# Patient Record
Sex: FEMALE | URBAN - METROPOLITAN AREA
[De-identification: names, ages, dates, MRNs, and addresses within clinical notes are randomized per-mention and may not be internally consistent; named-entity substitution may affect disease eponyms.]

---

## 2017-08-01 PROBLEM — Z00.00 ENCOUNTER FOR PREVENTIVE HEALTH EXAMINATION: Status: ACTIVE | Noted: 2017-08-01

## 2018-03-06 ENCOUNTER — INPATIENT (INPATIENT)
Facility: HOSPITAL | Age: 73
LOS: 1 days | Discharge: HOME | DRG: 689 | End: 2018-03-08
Attending: INTERNAL MEDICINE | Admitting: INTERNAL MEDICINE
Payer: MEDICARE

## 2018-03-06 VITALS
WEIGHT: 199.96 LBS | TEMPERATURE: 99 F | HEART RATE: 62 BPM | OXYGEN SATURATION: 97 % | SYSTOLIC BLOOD PRESSURE: 154 MMHG | DIASTOLIC BLOOD PRESSURE: 74 MMHG | RESPIRATION RATE: 18 BRPM

## 2018-03-06 DIAGNOSIS — Z29.9 ENCOUNTER FOR PROPHYLACTIC MEASURES, UNSPECIFIED: ICD-10-CM

## 2018-03-06 DIAGNOSIS — N18.9 CHRONIC KIDNEY DISEASE, UNSPECIFIED: ICD-10-CM

## 2018-03-06 DIAGNOSIS — G35 MULTIPLE SCLEROSIS: ICD-10-CM

## 2018-03-06 DIAGNOSIS — G92 TOXIC ENCEPHALOPATHY: ICD-10-CM

## 2018-03-06 DIAGNOSIS — N39.0 URINARY TRACT INFECTION, SITE NOT SPECIFIED: ICD-10-CM

## 2018-03-06 DIAGNOSIS — E78.5 HYPERLIPIDEMIA, UNSPECIFIED: ICD-10-CM

## 2018-03-06 DIAGNOSIS — I10 ESSENTIAL (PRIMARY) HYPERTENSION: ICD-10-CM

## 2018-03-06 DIAGNOSIS — R63.8 OTHER SYMPTOMS AND SIGNS CONCERNING FOOD AND FLUID INTAKE: ICD-10-CM

## 2018-03-06 LAB
ALBUMIN SERPL ELPH-MCNC: 3.6 G/DL — SIGNIFICANT CHANGE UP (ref 3.3–5)
ALP SERPL-CCNC: 88 U/L — SIGNIFICANT CHANGE UP (ref 40–120)
ALT FLD-CCNC: 16 U/L — SIGNIFICANT CHANGE UP (ref 10–45)
ANION GAP SERPL CALC-SCNC: 11 MMOL/L — SIGNIFICANT CHANGE UP (ref 5–17)
APPEARANCE UR: CLEAR — SIGNIFICANT CHANGE UP
AST SERPL-CCNC: 21 U/L — SIGNIFICANT CHANGE UP (ref 10–40)
BACTERIA # UR AUTO: PRESENT /HPF
BASOPHILS NFR BLD AUTO: 0.4 % — SIGNIFICANT CHANGE UP (ref 0–2)
BILIRUB SERPL-MCNC: 0.2 MG/DL — SIGNIFICANT CHANGE UP (ref 0.2–1.2)
BILIRUB UR-MCNC: NEGATIVE — SIGNIFICANT CHANGE UP
BUN SERPL-MCNC: 20 MG/DL — SIGNIFICANT CHANGE UP (ref 7–23)
CALCIUM SERPL-MCNC: 9.7 MG/DL — SIGNIFICANT CHANGE UP (ref 8.4–10.5)
CHLORIDE SERPL-SCNC: 105 MMOL/L — SIGNIFICANT CHANGE UP (ref 96–108)
CO2 SERPL-SCNC: 24 MMOL/L — SIGNIFICANT CHANGE UP (ref 22–31)
COLOR SPEC: YELLOW — SIGNIFICANT CHANGE UP
CREAT SERPL-MCNC: 1.41 MG/DL — HIGH (ref 0.5–1.3)
DIFF PNL FLD: NEGATIVE — SIGNIFICANT CHANGE UP
EOSINOPHIL NFR BLD AUTO: 3.1 % — SIGNIFICANT CHANGE UP (ref 0–6)
EPI CELLS # UR: (no result) /HPF (ref 0–5)
GLUCOSE SERPL-MCNC: 103 MG/DL — HIGH (ref 70–99)
GLUCOSE UR QL: NEGATIVE — SIGNIFICANT CHANGE UP
HCT VFR BLD CALC: 34.9 % — SIGNIFICANT CHANGE UP (ref 34.5–45)
HGB BLD-MCNC: 11.3 G/DL — LOW (ref 11.5–15.5)
KETONES UR-MCNC: NEGATIVE — SIGNIFICANT CHANGE UP
LEUKOCYTE ESTERASE UR-ACNC: NEGATIVE — SIGNIFICANT CHANGE UP
LYMPHOCYTES # BLD AUTO: 22.5 % — SIGNIFICANT CHANGE UP (ref 13–44)
MCHC RBC-ENTMCNC: 31.7 PG — SIGNIFICANT CHANGE UP (ref 27–34)
MCHC RBC-ENTMCNC: 32.4 G/DL — SIGNIFICANT CHANGE UP (ref 32–36)
MCV RBC AUTO: 97.8 FL — SIGNIFICANT CHANGE UP (ref 80–100)
MONOCYTES NFR BLD AUTO: 11.5 % — SIGNIFICANT CHANGE UP (ref 2–14)
NEUTROPHILS NFR BLD AUTO: 62.5 % — SIGNIFICANT CHANGE UP (ref 43–77)
NITRITE UR-MCNC: NEGATIVE — SIGNIFICANT CHANGE UP
PH UR: 5.5 — SIGNIFICANT CHANGE UP (ref 5–8)
PLATELET # BLD AUTO: 352 K/UL — SIGNIFICANT CHANGE UP (ref 150–400)
POTASSIUM SERPL-MCNC: 4.2 MMOL/L — SIGNIFICANT CHANGE UP (ref 3.5–5.3)
POTASSIUM SERPL-SCNC: 4.2 MMOL/L — SIGNIFICANT CHANGE UP (ref 3.5–5.3)
PROT SERPL-MCNC: 7.2 G/DL — SIGNIFICANT CHANGE UP (ref 6–8.3)
PROT UR-MCNC: (no result) MG/DL
RBC # BLD: 3.57 M/UL — LOW (ref 3.8–5.2)
RBC # FLD: 14.8 % — SIGNIFICANT CHANGE UP (ref 10.3–16.9)
RBC CASTS # UR COMP ASSIST: < 5 /HPF — SIGNIFICANT CHANGE UP
SODIUM SERPL-SCNC: 140 MMOL/L — SIGNIFICANT CHANGE UP (ref 135–145)
SP GR SPEC: 1.02 — SIGNIFICANT CHANGE UP (ref 1–1.03)
UROBILINOGEN FLD QL: 0.2 E.U./DL — SIGNIFICANT CHANGE UP
WBC # BLD: 7.6 K/UL — SIGNIFICANT CHANGE UP (ref 3.8–10.5)
WBC # FLD AUTO: 7.6 K/UL — SIGNIFICANT CHANGE UP (ref 3.8–10.5)
WBC UR QL: < 5 /HPF — SIGNIFICANT CHANGE UP

## 2018-03-06 PROCEDURE — 99285 EMERGENCY DEPT VISIT HI MDM: CPT

## 2018-03-06 PROCEDURE — 99223 1ST HOSP IP/OBS HIGH 75: CPT | Mod: GC

## 2018-03-06 RX ORDER — ATORVASTATIN CALCIUM 80 MG/1
20 TABLET, FILM COATED ORAL AT BEDTIME
Qty: 0 | Refills: 0 | Status: DISCONTINUED | OUTPATIENT
Start: 2018-03-06 | End: 2018-03-08

## 2018-03-06 RX ORDER — LOSARTAN POTASSIUM 100 MG/1
100 TABLET, FILM COATED ORAL AT BEDTIME
Qty: 0 | Refills: 0 | Status: DISCONTINUED | OUTPATIENT
Start: 2018-03-06 | End: 2018-03-07

## 2018-03-06 RX ORDER — HEPARIN SODIUM 5000 [USP'U]/ML
5000 INJECTION INTRAVENOUS; SUBCUTANEOUS EVERY 8 HOURS
Qty: 0 | Refills: 0 | Status: DISCONTINUED | OUTPATIENT
Start: 2018-03-06 | End: 2018-03-08

## 2018-03-06 RX ORDER — CEFEPIME 1 G/1
1000 INJECTION, POWDER, FOR SOLUTION INTRAMUSCULAR; INTRAVENOUS ONCE
Qty: 0 | Refills: 0 | Status: COMPLETED | OUTPATIENT
Start: 2018-03-07 | End: 2018-03-07

## 2018-03-06 RX ORDER — TAMSULOSIN HYDROCHLORIDE 0.4 MG/1
0.4 CAPSULE ORAL
Qty: 0 | Refills: 0 | Status: DISCONTINUED | OUTPATIENT
Start: 2018-03-06 | End: 2018-03-08

## 2018-03-06 RX ORDER — ISOSORBIDE MONONITRATE 60 MG/1
120 TABLET, EXTENDED RELEASE ORAL AT BEDTIME
Qty: 0 | Refills: 0 | Status: DISCONTINUED | OUTPATIENT
Start: 2018-03-06 | End: 2018-03-08

## 2018-03-06 RX ORDER — CEFEPIME 1 G/1
1000 INJECTION, POWDER, FOR SOLUTION INTRAMUSCULAR; INTRAVENOUS ONCE
Qty: 0 | Refills: 0 | Status: COMPLETED | OUTPATIENT
Start: 2018-03-06 | End: 2018-03-06

## 2018-03-06 RX ORDER — LEVOTHYROXINE SODIUM 125 MCG
175 TABLET ORAL DAILY
Qty: 0 | Refills: 0 | Status: DISCONTINUED | OUTPATIENT
Start: 2018-03-06 | End: 2018-03-08

## 2018-03-06 RX ORDER — ASCORBIC ACID 60 MG
250 TABLET,CHEWABLE ORAL DAILY
Qty: 0 | Refills: 0 | Status: DISCONTINUED | OUTPATIENT
Start: 2018-03-06 | End: 2018-03-08

## 2018-03-06 RX ORDER — MEMANTINE HYDROCHLORIDE 10 MG/1
10 TABLET ORAL DAILY
Qty: 0 | Refills: 0 | Status: DISCONTINUED | OUTPATIENT
Start: 2018-03-06 | End: 2018-03-08

## 2018-03-06 RX ORDER — ACETAMINOPHEN 500 MG
1000 TABLET ORAL EVERY 12 HOURS
Qty: 0 | Refills: 0 | Status: DISCONTINUED | OUTPATIENT
Start: 2018-03-06 | End: 2018-03-08

## 2018-03-06 RX ORDER — ASPIRIN/CALCIUM CARB/MAGNESIUM 324 MG
81 TABLET ORAL DAILY
Qty: 0 | Refills: 0 | Status: DISCONTINUED | OUTPATIENT
Start: 2018-03-06 | End: 2018-03-08

## 2018-03-06 RX ORDER — CHOLECALCIFEROL (VITAMIN D3) 125 MCG
2000 CAPSULE ORAL DAILY
Qty: 0 | Refills: 0 | Status: DISCONTINUED | OUTPATIENT
Start: 2018-03-06 | End: 2018-03-08

## 2018-03-06 RX ORDER — LACTOBACILLUS ACIDOPHILUS 100MM CELL
1 CAPSULE ORAL
Qty: 0 | Refills: 0 | Status: DISCONTINUED | OUTPATIENT
Start: 2018-03-06 | End: 2018-03-08

## 2018-03-06 RX ORDER — GABAPENTIN 400 MG/1
600 CAPSULE ORAL
Qty: 0 | Refills: 0 | Status: DISCONTINUED | OUTPATIENT
Start: 2018-03-06 | End: 2018-03-08

## 2018-03-06 RX ORDER — ROPINIROLE 8 MG/1
2 TABLET, FILM COATED, EXTENDED RELEASE ORAL
Qty: 0 | Refills: 0 | Status: DISCONTINUED | OUTPATIENT
Start: 2018-03-06 | End: 2018-03-07

## 2018-03-06 RX ADMIN — Medication 1 TABLET(S): at 18:30

## 2018-03-06 RX ADMIN — ISOSORBIDE MONONITRATE 120 MILLIGRAM(S): 60 TABLET, EXTENDED RELEASE ORAL at 18:45

## 2018-03-06 RX ADMIN — Medication 1000 MILLIGRAM(S): at 22:15

## 2018-03-06 RX ADMIN — CEFEPIME 100 MILLIGRAM(S): 1 INJECTION, POWDER, FOR SOLUTION INTRAMUSCULAR; INTRAVENOUS at 15:14

## 2018-03-06 RX ADMIN — Medication 1000 MILLIGRAM(S): at 21:15

## 2018-03-06 RX ADMIN — TAMSULOSIN HYDROCHLORIDE 0.4 MILLIGRAM(S): 0.4 CAPSULE ORAL at 18:30

## 2018-03-06 RX ADMIN — ATORVASTATIN CALCIUM 20 MILLIGRAM(S): 80 TABLET, FILM COATED ORAL at 22:16

## 2018-03-06 RX ADMIN — GABAPENTIN 600 MILLIGRAM(S): 400 CAPSULE ORAL at 18:31

## 2018-03-06 RX ADMIN — ROPINIROLE 2 MILLIGRAM(S): 8 TABLET, FILM COATED, EXTENDED RELEASE ORAL at 18:31

## 2018-03-06 RX ADMIN — HEPARIN SODIUM 5000 UNIT(S): 5000 INJECTION INTRAVENOUS; SUBCUTANEOUS at 21:58

## 2018-03-06 RX ADMIN — LOSARTAN POTASSIUM 100 MILLIGRAM(S): 100 TABLET, FILM COATED ORAL at 18:45

## 2018-03-06 NOTE — H&P ADULT - PROBLEM SELECTOR PLAN 7
- SQH for DVT ppx. Improve 2 - Dash/TLC diet.   - No IVF  - Replete electrolytes as needed. - Continue home medication.

## 2018-03-06 NOTE — ED ADULT NURSE NOTE - CHPI ED SYMPTOMS NEG
no diarrhea/no chills/no fever/no nausea/no vomiting/no abdominal distension/no blood in stool/no hematuria

## 2018-03-06 NOTE — ED ADULT NURSE NOTE - CHIEF COMPLAINT QUOTE
sent in by Dr. Trupti Prado [phone]  for abnormal UA done 3/1/18.  Hx UTI and macrobid resistant bacteria in the urine. Hx MS (wheelchair bound)

## 2018-03-06 NOTE — H&P ADULT - PROBLEM SELECTOR PLAN 1
- Negative UA here, UCx pending, but outpatient records shows very positive UA w/ sensitivities. Sensitive to macrobid but may not have been best possible treatment.  - c/w cefepime 1g BID  - f/u UCx. - Negative UA here, UCx pending, but outpatient records shows very positive UA w/ sensitivities. Sensitive to macrobid but may not have been best possible treatment.  - c/w cefepime 1g BID  - Will need ID approval in AM.  - f/u UCx. - Negative UA here, UCx pending, but outpatient records shows very positive UA w/ sensitivities. Sensitive to macrobid but may not have been best possible treatment.  - c/w cefepime 1g BID  - Will need ID approval in AM.  - f/u UCx, BCx.  - Family expressed desire to take patient home with PICC line and IV antibiotics in AM before the snow starts.

## 2018-03-06 NOTE — H&P ADULT - NSHPLABSRESULTS_GEN_ALL_CORE
.  LABS:                         11.3   7.6   )-----------( 352      ( 06 Mar 2018 14:49 )             34.9     03-06    140  |  105  |  20  ----------------------------<  103<H>  4.2   |  24  |  1.41<H>    Ca    9.7      06 Mar 2018 14:49    TPro  7.2  /  Alb  3.6  /  TBili  0.2  /  DBili  x   /  AST  21  /  ALT  16  /  AlkPhos  88  -      Urinalysis Basic - ( 06 Mar 2018 15:11 )    Color: Yellow / Appearance: Clear / S.020 / pH: x  Gluc: x / Ketone: NEGATIVE  / Bili: Negative / Urobili: 0.2 E.U./dL   Blood: x / Protein: Trace mg/dL / Nitrite: NEGATIVE   Leuk Esterase: NEGATIVE / RBC: < 5 /HPF / WBC < 5 /HPF   Sq Epi: x / Non Sq Epi: 5-10 /HPF / Bacteria: Present /HPF                RADIOLOGY, EKG & ADDITIONAL TESTS: Reviewed.

## 2018-03-06 NOTE — H&P ADULT - PROBLEM SELECTOR PLAN 6
- Dash/TLC diet.   - No IVF  - Replete electrolytes as needed. Patient with baseline Cr. that has ranged from 1.2 to 2.0.

## 2018-03-06 NOTE — H&P ADULT - PROBLEM SELECTOR PLAN 2
- Likely due to UTI as consistent with patient history and presentation of UTI. Patient is on significant amount of medication, consider polypharmacy as a possibility.  - Treatment for UTI as above. Continue to monitor mental status.

## 2018-03-06 NOTE — ED PROVIDER NOTE - NEUROLOGICAL, MLM
Alert and oriented to person and place not to situation no focal deficits, no motor or sensory deficits.

## 2018-03-06 NOTE — H&P ADULT - ATTENDING COMMENTS
patient seen and examined; pt denies any pain initially but then reported on sitting up during lung exam of neck pain , stated that pain has been going on for 1 week     reviewed vs, labs     agree w/ PE findings as above w/ additions/ exceptions/ pertinent findings:   gen: asleep awoken, tired appearing, NAD, oriented to person, place (hospital)  heent: perrla, no photophobia, tongue appears dry, no JVD, no ncuhal rigidity, FROM of neck   cvs: s1s2  lungs: CTA b/l  abd: soft/nt/nd, no CVA tenderness b/l   ext: no lower ext tenderness b/l, MS 4/5 at the left lower ext b/l , 5/5 at the right lower ext     1. UTI w/ AMS: c/w cefepime based on outside UCTX results as noted above, followup Cultures, start IVFs overnight   2. neck pain: pain control, follow up xray, monitor for worsening sxs  3. MS: stable   4. HTN: elevated BP improved w/ taking evening Bp medications, monitor for now   5. CKD: monitor renal function

## 2018-03-06 NOTE — ED PROVIDER NOTE - MEDICAL DECISION MAKING DETAILS
weakness likely 2' to chronic UTI- admit for IV abx- pan R to all others incl macrobid and MS worse than normal- px is too weak to stand

## 2018-03-06 NOTE — ED ADULT NURSE NOTE - OBJECTIVE STATEMENT
A&Ox3 pt BIBA on referral of PMD for chronic UTI.  Pt requires assistance to transfer from [facility] to [facility].  Pt denies N/V, fever/chills or abdominal pain.  Pt is pending lab work and UA.

## 2018-03-06 NOTE — ED PROVIDER NOTE - OBJECTIVE STATEMENT
73 F w hx of MS w weakness- px w hx of resistant uti- was treated w Macrobid for 2 weeks- without improvement  px does not co sx- but infection manifests as worsening balance- unable to stand, and loss of short term memory  sx improve after successful rx of UTI- px's MS- neuro Dr Prado- [phone]- was worked px up for other causes   and feels uti is the exacerbator of her current sx  no sig allev sx  moderate severity

## 2018-03-06 NOTE — ED ADULT TRIAGE NOTE - CHIEF COMPLAINT QUOTE
sent in by Dr. Trupti Prado [phone]  for abnormal UA done 3/1/18.  Hx UTI and macrobid resistant bacteria in the urine. Hx MS (wheelchair bound)

## 2018-03-06 NOTE — H&P ADULT - ASSESSMENT
73F w/ MS and recurrent UTI who presents with weakness and AMS likely 2/2 UTI although may be worsened by polypharmacy which failed outpatient treatment with Macrobid.

## 2018-03-06 NOTE — H&P ADULT - PROBLEM SELECTOR PLAN 3
- Stable.  - f/u w/ Neurologist Dr. Prado for collateral. -see later event note; pt reports neck pain, chronicity unclear, possible radiculopathy; pain control, xray of c-spine ordered

## 2018-03-06 NOTE — H&P ADULT - NSHPPHYSICALEXAM_GEN_ALL_CORE
.  VITAL SIGNS:  T(C): 37 (03-06-18 @ 13:30), Max: 37 (03-06-18 @ 13:30)  T(F): 98.6 (03-06-18 @ 13:30), Max: 98.6 (03-06-18 @ 13:30)  HR: 61 (03-06-18 @ 16:34) (61 - 62)  BP: 148/74 (03-06-18 @ 16:34) (148/74 - 154/74)  BP(mean): --  RR: 18 (03-06-18 @ 16:34) (18 - 18)  SpO2: 97% (03-06-18 @ 16:34) (97% - 97%)  Wt(kg): --    PHYSICAL EXAM:    Constitutional: WDWN resting comfortably in bed; NAD  Head: NC/AT  Eyes: PERRL, EOMI, anicteric sclera  ENT: no nasal discharge; uvula midline, no oropharyngeal erythema or exudates; MMM  Neck: supple; no JVD or thyromegaly  Respiratory: CTA B/L; no W/R/R, no retractions  Cardiac: +S1/S2; RRR; no M/R/G; PMI non-displaced  Gastrointestinal: soft, NT/ND; no rebound or guarding; +BSx4  Back: spine midline, no bony tenderness or step-offs; no CVAT B/L  Extremities: WWP, no clubbing or cyanosis; no peripheral edema  Musculoskeletal: NROM x4; no joint swelling, tenderness or erythema  Vascular: 2+ radial, femoral, DP/PT pulses B/L  Dermatologic: skin warm, dry and intact; no rashes, wounds, or scars  Lymphatic: no submandibular or cervical LAD  Neurologic: AAOx1.5 (Person, knew she was in a hospital); CNII-XII grossly intact; Sensation intact. LLE strength 4/5. Strength otherwise intact. Dysmetria on L. Intact on R.   Psychiatric: affect and characteristics of appearance, verbalizations, behaviors are appropriate

## 2018-03-06 NOTE — H&P ADULT - HISTORY OF PRESENT ILLNESS
73F pmhx MS, recurrent UTI HTN, HLD who presents after treatment for a UTI for 2 week with macrobid without improvement. Diagnosed with MS in 2001, initial symptom was weakness in the lower extremity. Per the daughter the patient has had extensive workup for her MS including endoscopy, cystoscopy and more details can be provided by Dr. Prado, but it has been well controlled since diagnosis until the past year when she began to have urinary tract infections on a regular basis. Unclear of how frequent the infections have occurred, but infection manifests as worsening balance with inability to stand, and loss of short term memory and her symptoms always improve after successful treatment. At baseline the patient spends most of her time in a wheelchair but is usually orientedx3 and can ambulate, currently she is too weak to stand and is A&Ox1.5.     She came in with outpatient records which show a very positive UA with MDR E-coli sensitive to Cefepime, Ceftazidime, Ertapenem, Gentamicin, Imipenem, Nitrofurantoin (<= 16), and Tobramycin. She was treated with Nitrofurantoin for 2 weeks without improvement in her symptoms.    In the ED VS T98.6, HR 62, /74, RR 18 SpO2 97% RA. She was given 1 dose of cefepime 1g and endorsed to the medicine service for further care.     The patient denies any urinary symptoms, fevers, chills, nausea, vomiting, diarrhea, constipation, cp, sob. ROS otherwise negative.

## 2018-03-06 NOTE — CHART NOTE - NSCHARTNOTEFT_GEN_A_CORE
Notified by RN that patient had minor fall off toilet in bathroom. Per report patient slipped forward off toiled and fell to knees. Patient with no head trauma, LOC, seizure activity and no dizziness upon standing but with know LE weakness and feels like her legs just did not support her. Patient now reporting 5/10 right neck pain which radiates to the right arm. Pain increases to 8/10 when patient moves neck. Range of motion intact. No visible signs of trauma and not TTP over c-spine or arm.     Plan:  - XR cervical spine to rule out fracture  - If patient has signs of fracture will consult orthopedics.   - Tylenol 1000mg Notified by RN that patient had minor fall off toilet in bathroom. Per report patient slipped forward off toiled and fell to knees. Patient with no head trauma, LOC, seizure activity and no dizziness upon standing but with know LE weakness and feels like her legs just did not support her. Patient now reporting 5/10 right neck pain which radiates to the right arm. Pain increases to 8/10 when patient moves neck. Range of motion intact. No visible signs of trauma and not TTP over c-spine or arm.     Plan:  - XR cervical spine to rule out fracture  - If patient has signs of fracture will consult orthopedics.   - Tylenol 1000mg    Update:  Family member at bedside gave personal tylenol to patient 1G. Instructed not to give any home medications to patient and that everything needs to go through pharmacy and nursing staff. She expressed understanding and said she would take all home medications home or have them verified with the pharmacy through the nurse.

## 2018-03-07 ENCOUNTER — TRANSCRIPTION ENCOUNTER (OUTPATIENT)
Age: 73
End: 2018-03-07

## 2018-03-07 DIAGNOSIS — E03.9 HYPOTHYROIDISM, UNSPECIFIED: ICD-10-CM

## 2018-03-07 DIAGNOSIS — M54.2 CERVICALGIA: ICD-10-CM

## 2018-03-07 LAB
ALBUMIN SERPL ELPH-MCNC: 3.1 G/DL — LOW (ref 3.3–5)
ALP SERPL-CCNC: 72 U/L — SIGNIFICANT CHANGE UP (ref 40–120)
ALT FLD-CCNC: 13 U/L — SIGNIFICANT CHANGE UP (ref 10–45)
ANION GAP SERPL CALC-SCNC: 11 MMOL/L — SIGNIFICANT CHANGE UP (ref 5–17)
AST SERPL-CCNC: 19 U/L — SIGNIFICANT CHANGE UP (ref 10–40)
BILIRUB SERPL-MCNC: 0.3 MG/DL — SIGNIFICANT CHANGE UP (ref 0.2–1.2)
BUN SERPL-MCNC: 17 MG/DL — SIGNIFICANT CHANGE UP (ref 7–23)
CALCIUM SERPL-MCNC: 9.2 MG/DL — SIGNIFICANT CHANGE UP (ref 8.4–10.5)
CHLORIDE SERPL-SCNC: 109 MMOL/L — HIGH (ref 96–108)
CO2 SERPL-SCNC: 22 MMOL/L — SIGNIFICANT CHANGE UP (ref 22–31)
CREAT SERPL-MCNC: 1.29 MG/DL — SIGNIFICANT CHANGE UP (ref 0.5–1.3)
CULTURE RESULTS: NO GROWTH — SIGNIFICANT CHANGE UP
GLUCOSE SERPL-MCNC: 93 MG/DL — SIGNIFICANT CHANGE UP (ref 70–99)
MAGNESIUM SERPL-MCNC: 2 MG/DL — SIGNIFICANT CHANGE UP (ref 1.6–2.6)
POTASSIUM SERPL-MCNC: 4 MMOL/L — SIGNIFICANT CHANGE UP (ref 3.5–5.3)
POTASSIUM SERPL-SCNC: 4 MMOL/L — SIGNIFICANT CHANGE UP (ref 3.5–5.3)
PROT SERPL-MCNC: 6.2 G/DL — SIGNIFICANT CHANGE UP (ref 6–8.3)
SODIUM SERPL-SCNC: 142 MMOL/L — SIGNIFICANT CHANGE UP (ref 135–145)
SPECIMEN SOURCE: SIGNIFICANT CHANGE UP
TSH SERPL-MCNC: 0.4 UIU/ML — SIGNIFICANT CHANGE UP (ref 0.35–4.94)

## 2018-03-07 PROCEDURE — 72040 X-RAY EXAM NECK SPINE 2-3 VW: CPT | Mod: 26

## 2018-03-07 PROCEDURE — 99233 SBSQ HOSP IP/OBS HIGH 50: CPT

## 2018-03-07 RX ORDER — BISOPROLOL FUMARATE 10 MG/1
2.5 TABLET, FILM COATED ORAL DAILY
Qty: 0 | Refills: 0 | Status: DISCONTINUED | OUTPATIENT
Start: 2018-03-07 | End: 2018-03-08

## 2018-03-07 RX ORDER — ASCORBIC ACID 60 MG
1 TABLET,CHEWABLE ORAL
Qty: 0 | Refills: 0 | COMMUNITY
Start: 2018-03-07

## 2018-03-07 RX ORDER — ROPINIROLE 8 MG/1
2 TABLET, FILM COATED, EXTENDED RELEASE ORAL
Qty: 0 | Refills: 0 | Status: DISCONTINUED | OUTPATIENT
Start: 2018-03-07 | End: 2018-03-07

## 2018-03-07 RX ORDER — NEBIVOLOL HYDROCHLORIDE 5 MG/1
5 TABLET ORAL DAILY
Qty: 0 | Refills: 0 | Status: DISCONTINUED | OUTPATIENT
Start: 2018-03-07 | End: 2018-03-07

## 2018-03-07 RX ORDER — OLMESARTAN MEDOXOMIL 5 MG/1
40 TABLET, FILM COATED ORAL AT BEDTIME
Qty: 0 | Refills: 0 | Status: DISCONTINUED | OUTPATIENT
Start: 2018-03-07 | End: 2018-03-08

## 2018-03-07 RX ORDER — SODIUM CHLORIDE 9 MG/ML
1000 INJECTION INTRAMUSCULAR; INTRAVENOUS; SUBCUTANEOUS
Qty: 0 | Refills: 0 | Status: DISCONTINUED | OUTPATIENT
Start: 2018-03-07 | End: 2018-03-07

## 2018-03-07 RX ORDER — CHOLECALCIFEROL (VITAMIN D3) 125 MCG
2000 CAPSULE ORAL
Qty: 0 | Refills: 0 | COMMUNITY
Start: 2018-03-07

## 2018-03-07 RX ORDER — ACETAMINOPHEN 500 MG
1000 TABLET ORAL
Qty: 0 | Refills: 0 | COMMUNITY

## 2018-03-07 RX ORDER — ISOSORBIDE MONONITRATE 60 MG/1
1 TABLET, EXTENDED RELEASE ORAL
Qty: 0 | Refills: 0 | COMMUNITY
Start: 2018-03-07

## 2018-03-07 RX ADMIN — Medication 1 TABLET(S): at 13:25

## 2018-03-07 RX ADMIN — Medication 81 MILLIGRAM(S): at 11:24

## 2018-03-07 RX ADMIN — CEFEPIME 100 MILLIGRAM(S): 1 INJECTION, POWDER, FOR SOLUTION INTRAMUSCULAR; INTRAVENOUS at 03:12

## 2018-03-07 RX ADMIN — GABAPENTIN 600 MILLIGRAM(S): 400 CAPSULE ORAL at 17:05

## 2018-03-07 RX ADMIN — GABAPENTIN 600 MILLIGRAM(S): 400 CAPSULE ORAL at 05:36

## 2018-03-07 RX ADMIN — ISOSORBIDE MONONITRATE 120 MILLIGRAM(S): 60 TABLET, EXTENDED RELEASE ORAL at 21:21

## 2018-03-07 RX ADMIN — ROPINIROLE 2 MILLIGRAM(S): 8 TABLET, FILM COATED, EXTENDED RELEASE ORAL at 17:05

## 2018-03-07 RX ADMIN — Medication 1 TABLET(S): at 09:26

## 2018-03-07 RX ADMIN — ROPINIROLE 2 MILLIGRAM(S): 8 TABLET, FILM COATED, EXTENDED RELEASE ORAL at 06:40

## 2018-03-07 RX ADMIN — HEPARIN SODIUM 5000 UNIT(S): 5000 INJECTION INTRAVENOUS; SUBCUTANEOUS at 13:25

## 2018-03-07 RX ADMIN — OLMESARTAN MEDOXOMIL 40 MILLIGRAM(S): 5 TABLET, FILM COATED ORAL at 21:21

## 2018-03-07 RX ADMIN — SODIUM CHLORIDE 100 MILLILITER(S): 9 INJECTION INTRAMUSCULAR; INTRAVENOUS; SUBCUTANEOUS at 03:12

## 2018-03-07 RX ADMIN — Medication 2000 UNIT(S): at 11:25

## 2018-03-07 RX ADMIN — MEMANTINE HYDROCHLORIDE 10 MILLIGRAM(S): 10 TABLET ORAL at 11:24

## 2018-03-07 RX ADMIN — ATORVASTATIN CALCIUM 20 MILLIGRAM(S): 80 TABLET, FILM COATED ORAL at 21:21

## 2018-03-07 RX ADMIN — Medication 175 MICROGRAM(S): at 05:40

## 2018-03-07 RX ADMIN — Medication 250 MILLIGRAM(S): at 11:24

## 2018-03-07 RX ADMIN — HEPARIN SODIUM 5000 UNIT(S): 5000 INJECTION INTRAVENOUS; SUBCUTANEOUS at 05:35

## 2018-03-07 RX ADMIN — TAMSULOSIN HYDROCHLORIDE 0.4 MILLIGRAM(S): 0.4 CAPSULE ORAL at 17:05

## 2018-03-07 RX ADMIN — HEPARIN SODIUM 5000 UNIT(S): 5000 INJECTION INTRAVENOUS; SUBCUTANEOUS at 21:21

## 2018-03-07 RX ADMIN — TAMSULOSIN HYDROCHLORIDE 0.4 MILLIGRAM(S): 0.4 CAPSULE ORAL at 05:35

## 2018-03-07 RX ADMIN — Medication 1 TABLET(S): at 17:05

## 2018-03-07 NOTE — PROGRESS NOTE ADULT - SUBJECTIVE AND OBJECTIVE BOX
INTERVAL EVENTS:    Vital Signs Last 12 Hrs  T(F): 97.5 (18 @ 05:31), Max: 98.3 (18 @ 19:57)  HR: 60 (18 @ 05:31) (60 - 91)  BP: 128/71 (18 @ 05:31) (128/71 - 190/77)  BP(mean): 102 (18 @ 19:57) (102 - 102)  RR: 19 (18 @ 05:31) (18 - 20)  SpO2: 96% (18 @ 05:31) (96% - 98%)  I&O's Summary      PHYSICAL EXAM:          LABS:                        11.3   7.6   )-----------( 352      ( 06 Mar 2018 14:49 )             34.9         140  |  105  |  20  ----------------------------<  103<H>  4.2   |  24  |  1.41<H>    Ca    9.7      06 Mar 2018 14:49    TPro  7.2  /  Alb  3.6  /  TBili  0.2  /  DBili  x   /  AST  21  /  ALT  16  /  AlkPhos  88  -      Urinalysis Basic - ( 06 Mar 2018 15:11 )    Color: Yellow / Appearance: Clear / S.020 / pH: x  Gluc: x / Ketone: NEGATIVE  / Bili: Negative / Urobili: 0.2 E.U./dL   Blood: x / Protein: Trace mg/dL / Nitrite: NEGATIVE   Leuk Esterase: NEGATIVE / RBC: < 5 /HPF / WBC < 5 /HPF   Sq Epi: x / Non Sq Epi: 5-10 /HPF / Bacteria: Present /HPF        RADIOLOGY & ADDITIONAL TESTS:    MEDICATIONS  (STANDING):  ascorbic acid 250 milliGRAM(s) Oral daily  aspirin enteric coated 81 milliGRAM(s) Oral daily  atorvastatin 20 milliGRAM(s) Oral at bedtime  cholecalciferol 2000 Unit(s) Oral daily  gabapentin 600 milliGRAM(s) Oral two times a day  heparin  Injectable 5000 Unit(s) SubCutaneous every 8 hours  isosorbide   mononitrate ER Tablet (IMDUR) 120 milliGRAM(s) Oral at bedtime  lactobacillus acidophilus 1 Tablet(s) Oral three times a day with meals  levothyroxine 175 MICROGram(s) Oral daily  losartan 100 milliGRAM(s) Oral at bedtime  memantine 10 milliGRAM(s) Oral daily  rOPINIRole 2 milliGRAM(s) Oral two times a day  sodium chloride 0.9%. 1000 milliLiter(s) (100 mL/Hr) IV Continuous <Continuous>  tamsulosin 0.4 milliGRAM(s) Oral two times a day    MEDICATIONS  (PRN):  acetaminophen   Tablet. 1000 milliGRAM(s) Oral every 12 hours PRN Moderate Pain (4 - 6) INTERVAL EVENTS: Patient seen and examined at bedside. Patient with minor fall last night, slipped forward off toiled and fell to knees. Patient with no head trauma, LOC, seizure activity and no dizziness upon standing but with know LE weakness and feels like her legs just did not support her. At that time reported, 5/10 right neck pain which radiates to the right arm and 8/10 when patient moves neck. Range of motion intact. No visible signs of trauma and not TTP over c-spine or arm. This morning, patient denies any overt pain in area. Went for XR cervical spine to rule out fracture, pending results. Received Tylenol 1000mg for pain, with improvement. Denies fevers, chills, CP, SOB, abdominal pain, N/V/D, urinary symptoms.     OBJECTIVE:    Vital Signs Last 12 Hrs  T(F): 97.5 (18 @ 05:31), Max: 98.3 (18 @ 19:57)  HR: 60 (18 @ 05:31) (60 - 91)  BP: 128/71 (18 @ 05:31) (128/71 - 190/77)  BP(mean): 102 (18 @ 19:57) (102 - 102)  RR: 19 (18 @ 05:31) (18 - 20)  SpO2: 96% (18 @ 05:31) (96% - 98%)  I&O's Summary      PHYSICAL EXAM:    Constitutional: NAD, AAOx3, comfortable in bed.   HEENT: PERRL, NCAT, MMM, anicteric, neck supple, no JVD, no LAD  Respiratory: Normal rate, rhythm, depth, effort. CTAB. No w/r/r.   Cardiovascular: RRR, normal S1 and S2, no m/r/g.   Gastrointestinal: +BS x4, soft NTND. No guarding or rigidity.   Extremities: wwp, no edema.   Vascular: 2+ radial, femoral, DP/PT pulses B/L  Neurological: Cranial nerves 2-12 grossly intact, strength and sensation intact throughout. Dysmetria on L. Intact on R.   Skin: Skin changes notes in lower extremities      LABS:                        11.3   7.6   )-----------( 352      ( 06 Mar 2018 14:49 )             34.9     03-06    140  |  105  |  20  ----------------------------<  103<H>  4.2   |  24  |  1.41<H>    Ca    9.7      06 Mar 2018 14:49    TPro  7.2  /  Alb  3.6  /  TBili  0.2  /  DBili  x   /  AST  21  /  ALT  16  /  AlkPhos  88  -      Urinalysis Basic - ( 06 Mar 2018 15:11 )    Color: Yellow / Appearance: Clear / S.020 / pH: x  Gluc: x / Ketone: NEGATIVE  / Bili: Negative / Urobili: 0.2 E.U./dL   Blood: x / Protein: Trace mg/dL / Nitrite: NEGATIVE   Leuk Esterase: NEGATIVE / RBC: < 5 /HPF / WBC < 5 /HPF   Sq Epi: x / Non Sq Epi: 5-10 /HPF / Bacteria: Present /HPF      RADIOLOGY & ADDITIONAL TESTS:    MEDICATIONS  (STANDING):  ascorbic acid 250 milliGRAM(s) Oral daily  aspirin enteric coated 81 milliGRAM(s) Oral daily  atorvastatin 20 milliGRAM(s) Oral at bedtime  cholecalciferol 2000 Unit(s) Oral daily  gabapentin 600 milliGRAM(s) Oral two times a day  heparin  Injectable 5000 Unit(s) SubCutaneous every 8 hours  isosorbide   mononitrate ER Tablet (IMDUR) 120 milliGRAM(s) Oral at bedtime  lactobacillus acidophilus 1 Tablet(s) Oral three times a day with meals  levothyroxine 175 MICROGram(s) Oral daily  losartan 100 milliGRAM(s) Oral at bedtime  memantine 10 milliGRAM(s) Oral daily  rOPINIRole 2 milliGRAM(s) Oral two times a day  sodium chloride 0.9%. 1000 milliLiter(s) (100 mL/Hr) IV Continuous <Continuous>  tamsulosin 0.4 milliGRAM(s) Oral two times a day    MEDICATIONS  (PRN):  acetaminophen   Tablet. 1000 milliGRAM(s) Oral every 12 hours PRN Moderate Pain (4 - 6) INTERVAL EVENTS: Patient seen and examined at bedside. Patient with minor fall last night, slipped forward off toiled and fell to knees. Patient with no head trauma, LOC, seizure activity and no dizziness upon standing but with know LE weakness and feels like her legs just did not support her. At that time reported, 5/10 right neck pain which radiates to the right arm and 8/10 when patient moves neck. Range of motion intact. No visible signs of trauma and not TTP over c-spine or arm. This morning, patient denies any overt pain in area. Went for XR cervical spine to rule out fracture, pending results. Received Tylenol 1000mg for pain, with improvement. Admits to urinary frequency but no urinary burning or pain. Denies fevers, chills, CP, SOB, abdominal pain, N/V/D.     OBJECTIVE:    Vital Signs Last 12 Hrs  T(F): 97.5 (18 @ 05:31), Max: 98.3 (18 @ 19:57)  HR: 60 (18 @ 05:31) (60 - 91)  BP: 128/71 (18 @ 05:31) (128/71 - 190/77)  BP(mean): 102 (18 @ 19:57) (102 - 102)  RR: 19 (18 @ 05:31) (18 - 20)  SpO2: 96% (18 @ 05:31) (96% - 98%)  I&O's Summary      PHYSICAL EXAM:    Constitutional: NAD, AAOx3, comfortable in bed.   HEENT: PERRL, NCAT, MMM, anicteric, neck supple, no JVD, no LAD  Respiratory: Normal rate, rhythm, depth, effort. CTAB. No w/r/r.   Cardiovascular: RRR, normal S1 and S2, no m/r/g.   Gastrointestinal: +BS x4, soft NTND. No guarding or rigidity.   Extremities: wwp, no edema.   Vascular: 2+ radial, femoral, DP/PT pulses B/L  Neurological: Cranial nerves 2-12 grossly intact, strength and sensation intact throughout. Dysmetria on L. Intact on R.   Skin: Skin changes notes in lower extremities      LABS:                        11.3   7.6   )-----------( 352      ( 06 Mar 2018 14:49 )             34.9     03-06    140  |  105  |  20  ----------------------------<  103<H>  4.2   |  24  |  1.41<H>    Ca    9.7      06 Mar 2018 14:49    TPro  7.2  /  Alb  3.6  /  TBili  0.2  /  DBili  x   /  AST  21  /  ALT  16  /  AlkPhos  88  -      Urinalysis Basic - ( 06 Mar 2018 15:11 )    Color: Yellow / Appearance: Clear / S.020 / pH: x  Gluc: x / Ketone: NEGATIVE  / Bili: Negative / Urobili: 0.2 E.U./dL   Blood: x / Protein: Trace mg/dL / Nitrite: NEGATIVE   Leuk Esterase: NEGATIVE / RBC: < 5 /HPF / WBC < 5 /HPF   Sq Epi: x / Non Sq Epi: 5-10 /HPF / Bacteria: Present /HPF      RADIOLOGY & ADDITIONAL TESTS:    MEDICATIONS  (STANDING):  ascorbic acid 250 milliGRAM(s) Oral daily  aspirin enteric coated 81 milliGRAM(s) Oral daily  atorvastatin 20 milliGRAM(s) Oral at bedtime  cholecalciferol 2000 Unit(s) Oral daily  gabapentin 600 milliGRAM(s) Oral two times a day  heparin  Injectable 5000 Unit(s) SubCutaneous every 8 hours  isosorbide   mononitrate ER Tablet (IMDUR) 120 milliGRAM(s) Oral at bedtime  lactobacillus acidophilus 1 Tablet(s) Oral three times a day with meals  levothyroxine 175 MICROGram(s) Oral daily  losartan 100 milliGRAM(s) Oral at bedtime  memantine 10 milliGRAM(s) Oral daily  rOPINIRole 2 milliGRAM(s) Oral two times a day  sodium chloride 0.9%. 1000 milliLiter(s) (100 mL/Hr) IV Continuous <Continuous>  tamsulosin 0.4 milliGRAM(s) Oral two times a day    MEDICATIONS  (PRN):  acetaminophen   Tablet. 1000 milliGRAM(s) Oral every 12 hours PRN Moderate Pain (4 - 6)

## 2018-03-07 NOTE — DISCHARGE NOTE ADULT - MEDICATION SUMMARY - MEDICATIONS TO TAKE
I will START or STAY ON the medications listed below when I get home from the hospital:    Elmeron  -- 100 milligram(s) by mouth 2 times a day  -- Indication: For Prophylactic measure    picamilon  -- 100 megabecquerel(s)  2 times a day  -- Indication: For Prophylactic measure    Methamine hippur  -- Indication: For Prophylactic measure    probiotics  -- Indication: For Prophylactic measure    Aspirin Enteric Coated 81 mg oral delayed release tablet  -- 1 tab(s) by mouth once a day  -- Indication: For Prophylactic measure    olmesartan 40 mg oral tablet  -- 1 tab(s) by mouth once a day  -- Indication: For HTN (hypertension)    tamsulosin  -- 0.4 milligram(s) by mouth 2 times a day  -- Indication: For CKD (chronic kidney disease)    isosorbide mononitrate 120 mg oral tablet, extended release  -- 1 tab(s) by mouth once a day (at bedtime)  -- Indication: For HTN (hypertension)    gabapentin  -- 600 milligram(s) by mouth 2 times a day  -- Indication: For Neck pain    Lipitor 20 mg oral tablet  -- 1 tab(s) by mouth once a day  -- Indication: For HLD (hyperlipidemia)    rOPINIRole 2 mg oral tablet, extended release  -- 1 tab(s) by mouth once a day  -- Indication: For Multiple sclerosis    bisoprolol 5 mg oral tablet  -- 1 tab(s) by mouth once a day  -- Indication: For HTN (hypertension)    Namenda  -- 10 milligram(s) by mouth once a day  -- Indication: For Multiple sclerosis    CoQ10 300 mg oral capsule  -- 1 cap(s) by mouth 2 times a day  -- Indication: For Multiple sclerosis    levothyroxine 175 mcg (0.175 mg) oral tablet  -- 1 tab(s) by mouth once a day  -- Indication: For Hypothyroidism    cholecalciferol oral tablet  -- 2000 unit(s) by mouth once a day  -- Indication: For Prophylactic measure    ascorbic acid 250 mg oral tablet  -- 1 tab(s) by mouth once a day  -- Indication: For Prophylactic measure

## 2018-03-07 NOTE — DISCHARGE NOTE ADULT - PATIENT PORTAL LINK FT
You can access the [facility] Portal, offered by [facility], by registering with the following website: [email or web address]

## 2018-03-07 NOTE — PROVIDER CONTACT NOTE (FALL NOTIFICATION) - ASSESSMENT
found in bathroom slumped over walker. No LOC, hematoma, bruises, bleeding noted. Patient felt weak and fell. C/o left shoulder pain

## 2018-03-07 NOTE — PROGRESS NOTE ADULT - PROBLEM SELECTOR PLAN 2
- Likely due to UTI as consistent with patient history and presentation of UTI. Patient is on significant amount of medication, consider polypharmacy as a possibility.  - Treatment for UTI as above. Continue to monitor mental status. - Likely due to UTI as consistent with patient history and presentation of UTI. Patient is on significant amount of medication, consider polypharmacy as a possibility.  - Treatment for UTI as above. Continue to monitor mental status. Mental status improved significantly since admission.

## 2018-03-07 NOTE — CHART NOTE - NSCHARTNOTEFT_GEN_A_CORE
Patients daughter has been calling constantly and harassing house staff about her mothers medical care. We have explained numerous times that  both the patients subjective and objective findings point to her not currently having a UTI. Daughter has resisted our explanation and is challenging our assessment. She reports she wants an "infectious disease consult" to evaluate her mother despite the fact that her urine culture has returned and is negative. Daughter is still concerned about a MDR organism causing her UTI. Attending has been notified of the current events and patient is medically stable to be discharged. Patient was given a 24 hour notice.

## 2018-03-07 NOTE — PROGRESS NOTE ADULT - PROBLEM SELECTOR PLAN 3
-see later event note; pt reports neck pain, chronicity unclear, possible radiculopathy; pain control, xray of c-spine ordered - Patient with s/p fall off toilet last night.  Patient with no head trauma, LOC, seizure activity and no dizziness upon standing but with know LE weakness and feels like her legs just did not support her. At that time reported, 5/10 right neck pain which radiates to the right arm and 8/10 when patient moves neck. Range of motion intact. No visible signs of trauma and not TTP over c-spine or arm. This morning, patient denies any overt pain in area. Went for XR cervical spine to rule out fracture, pending results. Received Tylenol 1000mg for pain, with improvement.  - if fracture noted, consider Ortho consult  - Gabapentin 600mg BID

## 2018-03-07 NOTE — DISCHARGE NOTE ADULT - CARE PLAN
Principal Discharge DX:	UTI (urinary tract infection)  Goal:	Decrease infection  Assessment and plan of treatment:	You have a history of chronic UTIs. You were sent in after taking Macrobid for 2 weeks without improvement in symptoms. Here, urine analysis and urine/blood cultures were negative. You received Cefepime 1g once. Because of your history of chronic UTIs, please take Bactrim 40mg/200mg daily. Please follow up with your PCP for further management of care.  Secondary Diagnosis:	Toxic metabolic encephalopathy  Goal:	Improve mental status  Assessment and plan of treatment:	You came in with altered mental status likely due to UTI. You received Cefepime 1g once. Mental status improved. Because of your history of chronic UTIs, please take Bactrim 40mg/200mg daily. Please follow up with your PCP for further management of care.  Secondary Diagnosis:	Neck pain  Goal:	Improve function  Assessment and plan of treatment:	You fell off the toilet last night, with no head trauma, lost of consciousness, seizure activity and no dizziness upon standing but with known Lower extremity weakness. At that time reported, 5/10 right neck pain which radiated to the right arm and 8/10 when moved neck. Range of motion intact. No visible signs of trauma and not tenderness over cervical spine or arm. This morning, you denied any overt pain in area. Went for XRay cervical spine to rule out fracture, pending results. Received Tylenol 1000mg for pain, with improvement. Please continue taking Gabapentin 600mg twice a day. Please follow up with your PCP for further management of care.  Secondary Diagnosis:	Multiple sclerosis  Goal:	Manage MS  Assessment and plan of treatment:	Stable. Follow up with your Neurologist Dr. Prado.  Secondary Diagnosis:	HTN (hypertension)  Goal:	Manage blood pressure  Assessment and plan of treatment:	Continue home medications, Cozaar 100mg daily, Imdur 120mg bedtime.  Secondary Diagnosis:	HLD (hyperlipidemia)  Goal:	Manage body lipids  Assessment and plan of treatment:	Continue home medication, Atorvastatin 20mg bedtime.  Secondary Diagnosis:	CKD (chronic kidney disease)  Goal:	Improve kidney function  Assessment and plan of treatment:	Your baseline Creatinine ranges from 1.2 to 2.0. Continue with Vitamin C and Vitamin D3 supplementation. Principal Discharge DX:	UTI (urinary tract infection)  Goal:	Decrease infection  Assessment and plan of treatment:	You have a history of chronic UTIs. You were sent in after taking Macrobid for 2 weeks without improvement in symptoms. Here, urine analysis and urine/blood cultures were negative. You received Cefepime 1g once. Please follow up with your PCP for further management of care.  Secondary Diagnosis:	Toxic metabolic encephalopathy  Goal:	Improve mental status  Assessment and plan of treatment:	You came in with altered mental status likely due to UTI. You received Cefepime 1g once. Mental status improved. Please follow up with your PCP for further management of care.  Secondary Diagnosis:	Neck pain  Goal:	Improve function  Assessment and plan of treatment:	You fell off the toilet during your stay, with no head trauma, lost of consciousness, seizure activity and no dizziness upon standing but with known Lower extremity weakness. At that time reported, 5/10 right neck pain which radiated to the right arm and 8/10 when moved neck. Range of motion intact. No visible signs of trauma and not tenderness over cervical spine or arm. This morning, you denied any overt pain in area. Went for XRay cervical spine to rule out fracture, which was negative. Received Tylenol 1000mg for pain, with improvement. Please continue taking Gabapentin 600mg twice a day. Please follow up with your PCP for further management of care.  Secondary Diagnosis:	Multiple sclerosis  Goal:	Manage MS  Assessment and plan of treatment:	Stable. Follow up with your Neurologist Dr. Prado.  Secondary Diagnosis:	HTN (hypertension)  Goal:	Manage blood pressure  Assessment and plan of treatment:	Continue home medications, Cozaar 100mg daily, Imdur 120mg bedtime.  Secondary Diagnosis:	HLD (hyperlipidemia)  Goal:	Manage body lipids  Assessment and plan of treatment:	Continue home medication, Atorvastatin 20mg bedtime.  Secondary Diagnosis:	CKD (chronic kidney disease)  Goal:	Improve kidney function  Assessment and plan of treatment:	Your baseline Creatinine ranges from 1.2 to 2.0. Continue with Vitamin C and Vitamin D3 supplementation. Principal Discharge DX:	UTI (urinary tract infection)  Goal:	Decrease infection  Assessment and plan of treatment:	You have a history of chronic UTIs. You were sent in after taking Macrobid for 2 weeks without improvement in symptoms. Here, urine analysis and urine/blood cultures were negative. You received Cefepime 1g once. Infectious disease consult was placed. They stated that there is no evidence of active urinary tract infection and recommended re-starting the nitrofurantoin and defer to her regular ID doctor for duration of prescription or switch to another agent if indicated. Please follow up with your PCP for further management of care.  Secondary Diagnosis:	Toxic metabolic encephalopathy  Goal:	Improve mental status  Assessment and plan of treatment:	You came in with altered mental status likely due to UTI. You received Cefepime 1g once. Mental status improved. Please follow up with your PCP for further management of care.  Secondary Diagnosis:	Neck pain  Goal:	Improve function  Assessment and plan of treatment:	You fell off the toilet during your stay, with no head trauma, lost of consciousness, seizure activity and no dizziness upon standing but with known Lower extremity weakness. At that time reported, 5/10 right neck pain which radiated to the right arm and 8/10 when moved neck. Range of motion intact. No visible signs of trauma and not tenderness over cervical spine or arm. This morning, you denied any overt pain in area. Went for XRay cervical spine to rule out fracture, which was negative. Received Tylenol 1000mg for pain, with improvement. Please continue taking Gabapentin 600mg twice a day. Please follow up with your PCP for further management of care.  Secondary Diagnosis:	Multiple sclerosis  Goal:	Manage MS  Assessment and plan of treatment:	Stable. Follow up with your Neurologist Dr. Prado.  Secondary Diagnosis:	HTN (hypertension)  Goal:	Manage blood pressure  Assessment and plan of treatment:	Continue home medications, Cozaar 100mg daily, Imdur 120mg bedtime.  Secondary Diagnosis:	HLD (hyperlipidemia)  Goal:	Manage body lipids  Assessment and plan of treatment:	Continue home medication, Atorvastatin 20mg bedtime.  Secondary Diagnosis:	CKD (chronic kidney disease)  Goal:	Improve kidney function  Assessment and plan of treatment:	Your baseline Creatinine ranges from 1.2 to 2.0. Continue with Vitamin C and Vitamin D3 supplementation.

## 2018-03-07 NOTE — PROGRESS NOTE ADULT - PROBLEM SELECTOR PLAN 8
F: No IVF  E: Replete electrolytes PRN K > 4, Mg > 2  N: Dash/TLC diet. - c/w Synthroid 175mg oral daily  - f/u AM TSH

## 2018-03-07 NOTE — PROGRESS NOTE ADULT - PROBLEM SELECTOR PLAN 1
- Negative UA here, UCx pending, but outpatient records shows very positive UA w/ sensitivities. Sensitive to macrobid but may not have been best possible treatment.  - c/w cefepime 1g BID  - Will need ID approval in AM.  - f/u UCx, BCx.  - Family expressed desire to take patient home with PICC line and IV antibiotics in AM before the snow starts. - Negative UA here, UCx pending, but outpatient records shows very positive UA w/ sensitivities. Sensitive to Macrobid but may not have been best possible treatment.  - c/w cefepime 1g BID  - Will need ID approval today  - f/u UCx  - BCx NGTD  - Family expressed desire to take patient home with PICC line and IV antibiotics  before the snow starts. - Negative UA here, UCx negative, but outpatient records shows very positive UA w/ sensitivities. Sensitive to Macrobid but may not have been best possible treatment.  - cefepime 1g x1  - Will need ID approval today if we continue ABX  - BCx NGTD  - Family expressed desire to take patient home with PICC line and IV antibiotics  before the snow starts.

## 2018-03-07 NOTE — DISCHARGE NOTE ADULT - HOSPITAL COURSE
A 73F PMH of MS, recurrent UTI HTN, HLD who presents after treatment for a UTI for 2 week with Macrobid without improvement. She came in with outpatient records which show a very positive UA with MDR E-coli sensitive to Cefepime, Ceftazidime, Ertapenem, Gentamicin, Imipenem, Nitrofurantoin (<= 16), and Tobramycin. She was treated with Nitrofurantoin for 2 weeks without improvement in her symptoms. Diagnosed with MS in 2001, initial symptom was weakness in the lower extremity. Per the daughter the patient has had extensive workup for her MS including endoscopy, cystoscopy and more details can be provided by Dr. Prado, but it has been well controlled since diagnosis until the past year when she began to have urinary tract infections on a regular basis. Unclear of how frequent the infections have occurred, but infection manifests as worsening balance with inability to stand, and loss of short term memory and her symptoms always improve after successful treatment. At baseline the patient spends most of her time in a wheelchair but is usually orientedx3 and can ambulate, currently she is too weak to stand and is A&Ox1.5. In the ED VS T98.6, HR 62, /74, RR 18 SpO2 97% RA. She was given 1 dose of cefepime 1g and endorsed to the medicine service for further care. Denied any urinary symptoms, fevers, chills, nausea, vomiting, diarrhea, constipation, CP, SOB. ROS otherwise negative.    On the floors, urine analysis, blood and urine cultures were negative. Decided not to continue antibiotic treatment and send patient on PPX suppressive antibiotics for chronic UTIs. Patient with minor fall, slipped forward off toiled and fell to knees. Patient with no head trauma, LOC, seizure activity and no dizziness upon standing but with know LE weakness and feels like her legs just did not support her. At that time reported, 5/10 right neck pain which radiates to the right arm and 8/10 when patient moves neck. Range of motion intact. No visible signs of trauma and not TTP over c-spine or arm. This morning, patient denies any overt pain in area. Went for XR cervical spine to rule out fracture, pending results. Received Tylenol 1000mg for pain, with improvement.    Patient hemodynamically stable and ready for discharge to home with HHA with Bactrim 40mg/200mg daily and follow up instructions. A 73F PMH of MS, visually impaired, recurrent UTI, HTN, HLD who presents after treatment for a UTI for 2 week with Macrobid without improvement. She came in with outpatient records which show a very positive UA with MDR E-coli sensitive to Cefepime, Ceftazidime, Ertapenem, Gentamicin, Imipenem, Nitrofurantoin (<= 16), and Tobramycin. She was treated with Nitrofurantoin for 2 weeks without improvement in her symptoms. Diagnosed with MS in 2001, initial symptom was weakness in the lower extremity. Per the daughter the patient has had extensive workup for her MS including endoscopy, cystoscopy and more details can be provided by Dr. Prado, but it has been well controlled since diagnosis until the past year when she began to have urinary tract infections on a regular basis. Unclear of how frequent the infections have occurred, but infection manifests as worsening balance with inability to stand, and loss of short term memory and her symptoms always improve after successful treatment. At baseline the patient spends most of her time in a wheelchair but is usually orientedx3 and can ambulate, currently she is too weak to stand and is A&Ox1.5. In the ED VS T98.6, HR 62, /74, RR 18 SpO2 97% RA. She was given 1 dose of cefepime 1g and endorsed to the medicine service for further care. Denied any urinary symptoms, fevers, chills, nausea, vomiting, diarrhea, constipation, CP, SOB. ROS otherwise negative.    On the floors, urine analysis, blood and urine cultures were negative. Decided not to continue antibiotic treatment and send patient on PPX suppressive antibiotics for chronic UTIs. Patient with minor fall, slipped forward off toiled and fell to knees. Patient with no head trauma, LOC, seizure activity and no dizziness upon standing but with know LE weakness and feels like her legs just did not support her. At that time reported, 5/10 right neck pain which radiates to the right arm and 8/10 when patient moves neck. Range of motion intact. No visible signs of trauma and not TTP over c-spine or arm. This morning, patient denies any overt pain in area. Went for XR cervical spine to rule out fracture, negative. Received Tylenol 1000mg for pain, with improvement.    As per patients family demands, ID consulted. Patient hemodynamically stable and ready for discharge to home with A with follow up instructions. A 73F PMH of MS, visually impaired, recurrent UTI, HTN, HLD who presents after treatment for a UTI for 2 week with Macrobid without improvement. She came in with outpatient records which show a very positive UA with MDR E-coli sensitive to Cefepime, Ceftazidime, Ertapenem, Gentamicin, Imipenem, Nitrofurantoin (<= 16), and Tobramycin. She was treated with Nitrofurantoin for 2 weeks without improvement in her symptoms. Diagnosed with MS in 2001, initial symptom was weakness in the lower extremity. Per the daughter the patient has had extensive workup for her MS including endoscopy, cystoscopy and more details can be provided by Dr. Prado, but it has been well controlled since diagnosis until the past year when she began to have urinary tract infections on a regular basis. Unclear of how frequent the infections have occurred, but infection manifests as worsening balance with inability to stand, and loss of short term memory and her symptoms always improve after successful treatment. At baseline the patient spends most of her time in a wheelchair but is usually orientedx3 and can ambulate, currently she is too weak to stand and is A&Ox1.5. In the ED VS T98.6, HR 62, /74, RR 18 SpO2 97% RA. She was given 1 dose of cefepime 1g and endorsed to the medicine service for further care. Denied any urinary symptoms, fevers, chills, nausea, vomiting, diarrhea, constipation, CP, SOB. ROS otherwise negative.    On the floors, urine analysis, blood and urine cultures were negative. Decided not to continue antibiotic treatment and send patient on PPX suppressive antibiotics for chronic UTIs. Patient with minor fall, slipped forward off toiled and fell to knees. Patient with no head trauma, LOC, seizure activity and no dizziness upon standing but with know LE weakness and feels like her legs just did not support her. At that time reported, 5/10 right neck pain which radiates to the right arm and 8/10 when patient moves neck. Range of motion intact. No visible signs of trauma and not TTP over c-spine or arm. This morning, patient denies any overt pain in area. Went for XR cervical spine to rule out fracture, negative. Received Tylenol 1000mg for pain, with improvement.    As per patients family demands, ID consulted.  Infectious disease consult was placed. They stated that there is no evidence of active urinary tract infection and recommended re-starting the nitrofurantoin and defer to her regular ID doctor for duration of prescription or switch to another agent if indicated. Please follow up with your PCP for further management of care. Patient hemodynamically stable and ready for discharge to home with HHA with follow up instructions.

## 2018-03-07 NOTE — PROVIDER CONTACT NOTE (FALL NOTIFICATION) - SITUATION
Patient's daughter walked patient to bathroom. Daughter came out of room and stated, "my mother fell in the bathroom. Patient found slumped over walker. Patient awake and conversive. No LOC

## 2018-03-07 NOTE — DISCHARGE NOTE ADULT - OTHER SIGNIFICANT FINDINGS
< from: Xray Cervical Spine AP and Lateral Only (03.07.18 @ 00:33) >      INTERPRETATION:  CLINICAL INDICATION: Neck pain, recent witnessed   mechanical fall with head trauma.    EXAM: AP and lateral views of the cervical spine.    COMPARISON: No priors.    FINDINGS:  The lateral views shows preserved lordosis with the neck in a   hyperextended position. There is poor visualization of the C7 vertebral   body secondary to overlapping shoulder opacity. The cervical vertebrae   appear preserved in height without compression deformity. No obvious   acute or displaced fracture is evident. There is no prevertebral soft   tissue swelling. The intervertebral disc spaces are slightly narrowed   posteriorly at afew levels but are grossly preserved throughout. There   is prominent anterior osteophyte lipping at the C5-6 level and less so at   C4-5.    IMPRESSION:    No radiographic evidence of cervical spine fracture or malalignment,   though C7 is obscured.          < end of copied text >

## 2018-03-07 NOTE — PROGRESS NOTE ADULT - ASSESSMENT
A 73F w/ MS and recurrent UTI who presents with weakness and AMS likely 2/2 UTI although may be worsened by polypharmacy which failed outpatient treatment with Macrobid.

## 2018-03-07 NOTE — DISCHARGE NOTE ADULT - SECONDARY DIAGNOSIS.
Toxic metabolic encephalopathy Neck pain Multiple sclerosis HTN (hypertension) HLD (hyperlipidemia) CKD (chronic kidney disease)

## 2018-03-07 NOTE — DISCHARGE NOTE ADULT - ADDITIONAL INSTRUCTIONS
Because of your history of chronic UTIs, please take Bactrim 40mg/200mg daily. Please follow up with your PCP for further management of care. Please follow up with your PCP for further management of care. Infectious disease consult was placed. They stated that there is no evidence of active urinary tract infection and recommended re-starting the nitrofurantoin and defer to her regular ID doctor for duration of prescription or switch to another agent if indicated. Please follow up with your PCP for further management of care.

## 2018-03-07 NOTE — PHYSICAL THERAPY INITIAL EVALUATION ADULT - PERTINENT HX OF CURRENT PROBLEM, REHAB EVAL
73F pmhx MS, recurrent UTI HTN, HLD who presents after treatment for a UTI for 2 week with macrobid without improvement. Diagnosed with MS in 2001, initial symptom was weakness in the lower extremity

## 2018-03-07 NOTE — PROGRESS NOTE ADULT - PROBLEM SELECTOR PLAN 9
DVT PPX: [facility], Improve 2  ETHICS: Full Code  DISPOSITION: Pending clinical improvement. F: No IVF  E: Replete electrolytes PRN K > 4, Mg > 2  N: Dash/TLC diet. F: Maintenance fluids NS at 100cc/hr  E: Replete electrolytes PRN K > 4, Mg > 2  N: Dash/TLC diet.

## 2018-03-07 NOTE — PHYSICAL THERAPY INITIAL EVALUATION ADULT - ADDITIONAL COMMENTS
As per patient, patient lives in house, 12 steps to get to second floor. Patient does have bedroom on first and second floor. Patient denies history of falls outside of fall that occurred on 3/6/18 in hospital bathroom. Patient receives home health assistance for 6-8 hours, 7 days a week.

## 2018-03-07 NOTE — PROGRESS NOTE ADULT - ATTENDING COMMENTS
Patient was seen and examined by me at bedside. I agree with resident's note, subjective, objective physical exam, assessment and plan with following modifications/additions.     1) Acute encephalopathy, likely 2/2 polypharmacy.  2) Uncomplicated UTI, treated --no need for more abx. needed.   3) CKD stage 3  4) Multiple sclerosis, not a flare.   5) Essential HTN    Plan: Will get PT evaluation. Patient is medically optimized and ready for discharge. Likely Home with PT and home services.

## 2018-03-07 NOTE — PROGRESS NOTE ADULT - PROBLEM SELECTOR PLAN 6
Patient with baseline Cr. that has ranged from 1.2 to 2.0. Patient with baseline Cr. that has ranged from 1.2 to 2.0.  - c/w Vitamin C and Vitamin D3 supplementation  - f/u Vit D levels

## 2018-03-07 NOTE — DISCHARGE NOTE ADULT - PLAN OF CARE
Decrease infection You have a history of chronic UTIs. You were sent in after taking Macrobid for 2 weeks without improvement in symptoms. Here, urine analysis and urine/blood cultures were negative. You received Cefepime 1g once. Because of your history of chronic UTIs, please take Bactrim 40mg/200mg daily. Please follow up with your PCP for further management of care. Improve mental status You came in with altered mental status likely due to UTI. You received Cefepime 1g once. Mental status improved. Because of your history of chronic UTIs, please take Bactrim 40mg/200mg daily. Please follow up with your PCP for further management of care. Improve function You fell off the toilet last night, with no head trauma, lost of consciousness, seizure activity and no dizziness upon standing but with known Lower extremity weakness. At that time reported, 5/10 right neck pain which radiated to the right arm and 8/10 when moved neck. Range of motion intact. No visible signs of trauma and not tenderness over cervical spine or arm. This morning, you denied any overt pain in area. Went for XRay cervical spine to rule out fracture, pending results. Received Tylenol 1000mg for pain, with improvement. Please continue taking Gabapentin 600mg twice a day. Please follow up with your PCP for further management of care. Manage MS Stable. Follow up with your Neurologist Dr. Prado. Manage blood pressure Continue home medications, Cozaar 100mg daily, Imdur 120mg bedtime. Manage body lipids Continue home medication, Atorvastatin 20mg bedtime. Improve kidney function Your baseline Creatinine ranges from 1.2 to 2.0. Continue with Vitamin C and Vitamin D3 supplementation. You have a history of chronic UTIs. You were sent in after taking Macrobid for 2 weeks without improvement in symptoms. Here, urine analysis and urine/blood cultures were negative. You received Cefepime 1g once. Please follow up with your PCP for further management of care. You came in with altered mental status likely due to UTI. You received Cefepime 1g once. Mental status improved. Please follow up with your PCP for further management of care. You fell off the toilet during your stay, with no head trauma, lost of consciousness, seizure activity and no dizziness upon standing but with known Lower extremity weakness. At that time reported, 5/10 right neck pain which radiated to the right arm and 8/10 when moved neck. Range of motion intact. No visible signs of trauma and not tenderness over cervical spine or arm. This morning, you denied any overt pain in area. Went for XRay cervical spine to rule out fracture, which was negative. Received Tylenol 1000mg for pain, with improvement. Please continue taking Gabapentin 600mg twice a day. Please follow up with your PCP for further management of care. You have a history of chronic UTIs. You were sent in after taking Macrobid for 2 weeks without improvement in symptoms. Here, urine analysis and urine/blood cultures were negative. You received Cefepime 1g once. Infectious disease consult was placed. They stated that there is no evidence of active urinary tract infection and recommended re-starting the nitrofurantoin and defer to her regular ID doctor for duration of prescription or switch to another agent if indicated. Please follow up with your PCP for further management of care.

## 2018-03-08 VITALS
RESPIRATION RATE: 16 BRPM | OXYGEN SATURATION: 97 % | TEMPERATURE: 98 F | DIASTOLIC BLOOD PRESSURE: 67 MMHG | HEART RATE: 63 BPM | SYSTOLIC BLOOD PRESSURE: 168 MMHG

## 2018-03-08 PROCEDURE — 99239 HOSP IP/OBS DSCHRG MGMT >30: CPT

## 2018-03-08 PROCEDURE — 87086 URINE CULTURE/COLONY COUNT: CPT

## 2018-03-08 PROCEDURE — 99285 EMERGENCY DEPT VISIT HI MDM: CPT | Mod: 25

## 2018-03-08 PROCEDURE — 97161 PT EVAL LOW COMPLEX 20 MIN: CPT

## 2018-03-08 PROCEDURE — 83735 ASSAY OF MAGNESIUM: CPT

## 2018-03-08 PROCEDURE — 85025 COMPLETE CBC W/AUTO DIFF WBC: CPT

## 2018-03-08 PROCEDURE — 72040 X-RAY EXAM NECK SPINE 2-3 VW: CPT

## 2018-03-08 PROCEDURE — 96374 THER/PROPH/DIAG INJ IV PUSH: CPT

## 2018-03-08 PROCEDURE — 84443 ASSAY THYROID STIM HORMONE: CPT

## 2018-03-08 PROCEDURE — 80053 COMPREHEN METABOLIC PANEL: CPT

## 2018-03-08 PROCEDURE — 81001 URINALYSIS AUTO W/SCOPE: CPT

## 2018-03-08 PROCEDURE — 87040 BLOOD CULTURE FOR BACTERIA: CPT

## 2018-03-08 PROCEDURE — 36415 COLL VENOUS BLD VENIPUNCTURE: CPT

## 2018-03-08 RX ADMIN — MEMANTINE HYDROCHLORIDE 10 MILLIGRAM(S): 10 TABLET ORAL at 12:25

## 2018-03-08 RX ADMIN — GABAPENTIN 600 MILLIGRAM(S): 400 CAPSULE ORAL at 05:41

## 2018-03-08 RX ADMIN — Medication 250 MILLIGRAM(S): at 12:30

## 2018-03-08 RX ADMIN — BISOPROLOL FUMARATE 2.5 MILLIGRAM(S): 10 TABLET, FILM COATED ORAL at 09:05

## 2018-03-08 RX ADMIN — Medication 2000 UNIT(S): at 12:25

## 2018-03-08 RX ADMIN — Medication 81 MILLIGRAM(S): at 12:26

## 2018-03-08 RX ADMIN — TAMSULOSIN HYDROCHLORIDE 0.4 MILLIGRAM(S): 0.4 CAPSULE ORAL at 05:41

## 2018-03-08 RX ADMIN — HEPARIN SODIUM 5000 UNIT(S): 5000 INJECTION INTRAVENOUS; SUBCUTANEOUS at 14:12

## 2018-03-08 RX ADMIN — Medication 175 MICROGRAM(S): at 05:41

## 2018-03-08 RX ADMIN — Medication 1 TABLET(S): at 12:26

## 2018-03-08 RX ADMIN — Medication 1 TABLET(S): at 09:05

## 2018-03-08 RX ADMIN — HEPARIN SODIUM 5000 UNIT(S): 5000 INJECTION INTRAVENOUS; SUBCUTANEOUS at 05:41

## 2018-03-08 NOTE — CONSULT NOTE ADULT - SUBJECTIVE AND OBJECTIVE BOX
INFECTIOUS DISEASE SERVICE INITIAL CONSULT NOTE    HPI:  73F pmhx MS, recurrent UTI HTN, HLD who presents after treatment for a UTI for 2 week with macrobid without improvement. Diagnosed with MS in , initial symptom was weakness in the lower extremity. Per the daughter the patient has had extensive workup for her MS including endoscopy, cystoscopy and more details can be provided by Dr. Prado, but it has been well controlled since diagnosis until the past year when she began to have urinary tract infections on a regular basis. Unclear of how frequent the infections have occurred, but infection manifests as worsening balance with inability to stand, and loss of short term memory and her symptoms always improve after successful treatment. At baseline the patient spends most of her time in a wheelchair but is usually orientedx3 and can ambulate, currently she is too weak to stand and is A&Ox1.5.     She came in with outpatient records which show a very positive UA with MDR E-coli sensitive to Cefepime, Ceftazidime, Ertapenem, Gentamicin, Imipenem, Nitrofurantoin (<= 16), and Tobramycin. She was treated with Nitrofurantoin for 2 weeks without improvement in her symptoms.    In the ED VS T98.6, HR 62, /74, RR 18 SpO2 97% RA. She was given 1 dose of cefepime 1g and endorsed to the medicine service for further care.     The patient denies any urinary symptoms, fevers, chills, nausea, vomiting, diarrhea, constipation, cp, sob. ROS otherwise negative. (06 Mar 2018 17:33)      ADDITIONAL ID HPI:    PAST MEDICAL & SURGICAL HISTORY:  MS (multiple sclerosis)  HLD (hyperlipidemia)  HTN (hypertension)  Chronic UTI  No significant past surgical history      REVIEW OF SYSTEMS:  Otherwise negative other than what is stated in the HPI.    ACTIVE ANTIMICROBIAL/ANTIBIOTIC MEDICATIONS:      PRIOR ANTIMICROBIAL HISTORY ON THIS ADMISSION:  cefepime  IVPB   100 mL/Hr IV Intermittent (18 @ 15:14)    cefepime  IVPB   100 mL/Hr IV Intermittent (18 @ 03:12)        OTHER MEDICATIONS:  acetaminophen   Tablet. 1000 milliGRAM(s) Oral every 12 hours PRN  ascorbic acid 250 milliGRAM(s) Oral daily  aspirin enteric coated 81 milliGRAM(s) Oral daily  atorvastatin 20 milliGRAM(s) Oral at bedtime  bisoprolol   Tablet 2.5 milliGRAM(s) Oral daily  cholecalciferol 2000 Unit(s) Oral daily  gabapentin 600 milliGRAM(s) Oral two times a day  heparin  Injectable 5000 Unit(s) SubCutaneous every 8 hours  isosorbide   mononitrate ER Tablet (IMDUR) 120 milliGRAM(s) Oral at bedtime  lactobacillus acidophilus 1 Tablet(s) Oral three times a day with meals  levothyroxine 175 MICROGram(s) Oral daily  memantine 10 milliGRAM(s) Oral daily  Norethindrone acetate/Ethinyl Estradiol 1mg/5mcg 1 Tablet(s) 1 Tablet(s) Oral daily  olmesartan 40 milliGRAM(s) Oral at bedtime  Ropinirole ER 2mg tablet 1 Tablet(s) 1 Tablet(s) Oral <User Schedule>  tamsulosin 0.4 milliGRAM(s) Oral two times a day      ALLERGIES:  Allergies    No Known Allergies    Intolerances        SOCIAL HISTORY:    FAMILY HISTORY:  No pertinent family history in first degree relatives      VITAL SIGNS:  ICU Vital Signs Last 24 Hrs  T(C): 36.9 (08 Mar 2018 08:30), Max: 36.9 (08 Mar 2018 08:30)  T(F): 98.5 (08 Mar 2018 08:30), Max: 98.5 (08 Mar 2018 08:30)  HR: 63 (08 Mar 2018 08:30) (63 - 76)  BP: 168/67 (08 Mar 2018 08:30) (124/66 - 168/67)  BP(mean): 115 (07 Mar 2018 15:30) (115 - 115)  ABP: --  ABP(mean): --  RR: 16 (08 Mar 2018 08:30) (16 - 18)  SpO2: 97% (08 Mar 2018 08:30) (96% - 97%)      PHYSICAL EXAM:  Constitutional: WDWN  Head: NC/AT  Eyes: PERRL; anicteric sclera  ENMT: no rhinorrhea; no sinus tenderness on palpation; no oropharyngeal lesions, erythema, or exudates	  Neck: supple; no JVD or LAD  Respiratory: CTA B/L  Cardiovascular: +S1/S2, RRR  Gastrointestinal: soft, NT/ND; intact BS; no HSM  Extremities: WWP; no clubbing, cyanosis, or edema  Vascular: 2+ radial, DP/PT pulses B/L  Dermatologic: skin warm and dry; no visible rashes or lesions  Neurologic: AAOx3; no focal deficits    LABS:                        11.3   7.6   )-----------( 352      ( 06 Mar 2018 14:49 )             34.9         142  |  109<H>  |  17  ----------------------------<  93  4.0   |  22  |  1.29    Ca    9.2      07 Mar 2018 06:32  Mg     2.0         TPro  6.2  /  Alb  3.1<L>  /  TBili  0.3  /  DBili  x   /  AST  19  /  ALT  13  /  AlkPhos  72        Urinalysis Basic - ( 06 Mar 2018 15:11 )    Color: Yellow / Appearance: Clear / S.020 / pH: x  Gluc: x / Ketone: NEGATIVE  / Bili: Negative / Urobili: 0.2 E.U./dL   Blood: x / Protein: Trace mg/dL / Nitrite: NEGATIVE   Leuk Esterase: NEGATIVE / RBC: < 5 /HPF / WBC < 5 /HPF   Sq Epi: x / Non Sq Epi: 5-10 /HPF / Bacteria: Present /HPF        MICROBIOLOGY:    Culture - Urine (collected 18 @ 15:21)  Source: .Urine Clean Catch (Midstream)  Final Report (18 @ 09:05):    No growth    Culture - Blood (collected 18 @ 15:14)  Source: .Blood Blood-Peripheral  Preliminary Report (18 @ 16:01):    No growth at 1 day.    Culture - Blood (collected 18 @ 15:14)  Source: .Blood Blood-Peripheral  Preliminary Report (18 @ 16:01):    No growth at 1 day.        RADIOLOGY & ADDITIONAL STUDIES: INFECTIOUS DISEASE SERVICE INITIAL CONSULT NOTE    HPI:  73F pmhx MS, recurrent UTI HTN, HLD who presents after treatment for a UTI for 2 week with macrobid without improvement. Diagnosed with MS in , initial symptom was weakness in the lower extremity. Per the daughter the patient has had extensive workup for her MS including endoscopy, cystoscopy and more details can be provided by Dr. Prado, but it has been well controlled since diagnosis until the past year when she began to have urinary tract infections on a regular basis. Unclear of how frequent the infections have occurred, but infection manifests as worsening balance with inability to stand, and loss of short term memory and her symptoms always improve after successful treatment. At baseline the patient spends most of her time in a wheelchair but is usually orientedx3 and can ambulate, currently she is too weak to stand and is A&Ox1.5.     She came in with outpatient records which show a very positive UA with MDR E-coli sensitive to Cefepime, Ceftazidime, Ertapenem, Gentamicin, Imipenem, Nitrofurantoin (<= 16), and Tobramycin. She was treated with Nitrofurantoin for 2 weeks without improvement in her symptoms.    In the ED VS T98.6, HR 62, /74, RR 18 SpO2 97% RA. She was given 1 dose of cefepime 1g and endorsed to the medicine service for further care.     The patient denies any urinary symptoms, fevers, chills, nausea, vomiting, diarrhea, constipation, cp, sob. ROS otherwise negative. (06 Mar 2018 17:33)      ADDITIONAL ID HPI: Patient continues to deny any urinary symptoms, fever, chills N/V/D.     PAST MEDICAL & SURGICAL HISTORY:  MS (multiple sclerosis)  HLD (hyperlipidemia)  HTN (hypertension)  Chronic UTI  No significant past surgical history      REVIEW OF SYSTEMS:  Otherwise negative other than what is stated in the HPI.    ACTIVE ANTIMICROBIAL/ANTIBIOTIC MEDICATIONS:      PRIOR ANTIMICROBIAL HISTORY ON THIS ADMISSION:  cefepime  IVPB   100 mL/Hr IV Intermittent (18 @ 15:14)    cefepime  IVPB   100 mL/Hr IV Intermittent (18 @ 03:12)        OTHER MEDICATIONS:  acetaminophen   Tablet. 1000 milliGRAM(s) Oral every 12 hours PRN  ascorbic acid 250 milliGRAM(s) Oral daily  aspirin enteric coated 81 milliGRAM(s) Oral daily  atorvastatin 20 milliGRAM(s) Oral at bedtime  bisoprolol   Tablet 2.5 milliGRAM(s) Oral daily  cholecalciferol 2000 Unit(s) Oral daily  gabapentin 600 milliGRAM(s) Oral two times a day  heparin  Injectable 5000 Unit(s) SubCutaneous every 8 hours  isosorbide   mononitrate ER Tablet (IMDUR) 120 milliGRAM(s) Oral at bedtime  lactobacillus acidophilus 1 Tablet(s) Oral three times a day with meals  levothyroxine 175 MICROGram(s) Oral daily  memantine 10 milliGRAM(s) Oral daily  Norethindrone acetate/Ethinyl Estradiol 1mg/5mcg 1 Tablet(s) 1 Tablet(s) Oral daily  olmesartan 40 milliGRAM(s) Oral at bedtime  Ropinirole ER 2mg tablet 1 Tablet(s) 1 Tablet(s) Oral <User Schedule>  tamsulosin 0.4 milliGRAM(s) Oral two times a day      ALLERGIES:  Allergies    No Known Allergies    Intolerances        SOCIAL HISTORY:    FAMILY HISTORY:  No pertinent family history in first degree relatives      VITAL SIGNS:  ICU Vital Signs Last 24 Hrs  T(C): 36.9 (08 Mar 2018 08:30), Max: 36.9 (08 Mar 2018 08:30)  T(F): 98.5 (08 Mar 2018 08:30), Max: 98.5 (08 Mar 2018 08:30)  HR: 63 (08 Mar 2018 08:30) (63 - 76)  BP: 168/67 (08 Mar 2018 08:30) (124/66 - 168/67)  BP(mean): 115 (07 Mar 2018 15:30) (115 - 115)  ABP: --  ABP(mean): --  RR: 16 (08 Mar 2018 08:30) (16 - 18)  SpO2: 97% (08 Mar 2018 08:30) (96% - 97%)      PHYSICAL EXAM:  Constitutional: WDWN  Head: NC/AT  Eyes: PERRL; anicteric sclera  ENMT: no rhinorrhea; no sinus tenderness on palpation; no oropharyngeal lesions, erythema, or exudates	  Neck: supple; no JVD or LAD  Respiratory: CTA B/L  Cardiovascular: +S1/S2, RRR  Gastrointestinal: soft, NT/ND; intact BS; no HSM; No tenderness in Suprapubic area, no CVA tendernes  Extremities: WWP; no clubbing, cyanosis, or edema  Vascular: 2+ radial, DP/PT pulses B/L  Dermatologic: skin warm and dry; no visible rashes or lesions  Neurologic: AAOx3; no focal deficits    LABS:                        11.3   7.6   )-----------( 352      ( 06 Mar 2018 14:49 )             34.9     -    142  |  109<H>  |  17  ----------------------------<  93  4.0   |  22  |  1.29    Ca    9.2      07 Mar 2018 06:32  Mg     2.0         TPro  6.2  /  Alb  3.1<L>  /  TBili  0.3  /  DBili  x   /  AST  19  /  ALT  13  /  AlkPhos  72        Urinalysis Basic - ( 06 Mar 2018 15:11 )    Color: Yellow / Appearance: Clear / S.020 / pH: x  Gluc: x / Ketone: NEGATIVE  / Bili: Negative / Urobili: 0.2 E.U./dL   Blood: x / Protein: Trace mg/dL / Nitrite: NEGATIVE   Leuk Esterase: NEGATIVE / RBC: < 5 /HPF / WBC < 5 /HPF   Sq Epi: x / Non Sq Epi: 5-10 /HPF / Bacteria: Present /HPF        MICROBIOLOGY:    Culture - Urine (collected 18 @ 15:21)  Source: .Urine Clean Catch (Midstream)  Final Report (18 @ 09:05):    No growth    Culture - Blood (collected 18 @ 15:14)  Source: .Blood Blood-Peripheral  Preliminary Report (18 @ 16:01):    No growth at 1 day.    Culture - Blood (collected 18 @ 15:14)  Source: .Blood Blood-Peripheral  Preliminary Report (18 @ 16:01):    No growth at 1 day.        RADIOLOGY & ADDITIONAL STUDIES:

## 2018-03-08 NOTE — CONSULT NOTE ADULT - ATTENDING COMMENTS
Infectious Diseases Attending - Please see resident note for full history    Mrs. Howard is well-known to me from a consultation about a year ago.    She has a history of severe multiple sclerosis followed by Dr. Turpti Prado; about a year ago, she experienced an acute exacerbation of her neurological symptoms, and was admitted to [facility] with "sepsis".  Although cultures were largely non-diagnostic, she received a long course of intravenous antibiotics in the hospital and an SNF.    Subsequently, her very attentive daughters developed the unshakable opinion that every decrement in neurological status is due to a urinary tract infection; this has led to multiple courses of antibiotics, many of which were not supported by adequate microbiological data, and the predictable development of a range of multiply-resistant antimicrobial prieto.  She had an extensive urological evaluation by her urologist, and no structural or anatomic abnormalities were found to explain her recurrent UTI's (except the neurogenic bladder - intermittent bladder catheterization was attempted in the past, with training by Dr. Prado' nurse).    She is followed by an ID specialist, Dr. Mckeon, who had her on a course of nitrofurantoin for an MDR E. coli, interrupted it for about 5 days, and then re-instituted it pending culture results.  According to the daughters, she was on nitrofurantoin for several days, up until admission to [facility] for increasing LE weakness.    Admission U/A was acellular, and urine culture was sterile.  WBC 7600  The patient denies dysuria (she in incontinent), renal or bladder discomfort.    She is afebrile, there is no CVA tendrness or bladder tenderness.    Impression: there is no evidence of active urinary tract infection.    Recommend: I would re-start the nitrofurantoin and defer to her regular ID doctor for duration of Rx or switch to another agent if indicated.    Discussed with team.

## 2018-03-08 NOTE — PROGRESS NOTE ADULT - SUBJECTIVE AND OBJECTIVE BOX
Pt seen and examined, pt with no complaints.  Say she has good appetitive, no constipation, no pain, no dysuria, all other ROS negative.  VSS  Exam- NAD, pleasant female, RRR, CTAB, abd soft, no CVAT, no SPT, spastic RLE likely 2/2 known MS  Labs and cx data reviewed  A/P: 74 yo F with  1. Treated UTI  2. Encephalopathy, now resolved  3. CKD 3  4. Multiple sclerosis  5. HTN  6. Hyperlipidemia  7. Hypothyroidism  Had two conversations with pt's daughters- one at bedside in the AM and one over the phone at 3 pm. They are very upset, threatening to crow the hospital and all the providers, verbally abusive and threatening to housestaff and myself.  Pt pleasant and closed eyes for most of the encounters.  Pt's daughters say that pt has a hx of UTIs and requires further tx with IV abx via a PICC line x 10 days.  They say that their outpt ID Dr Potts is agreeable to this plan, however per HS who talked to Dr Potts last evening, she says that the family is forceful and makes her give abx because they attribute her neurological deterioration to encephalopathy 2/2 UTIs, and not to her multiple sclerosis itself.  Dr Potts told HS that if UA/Ucx not suggestive of UTI then she does not require further tx, however the daughters are screaming/demanding/hysterical/cursing that Dr Potts wants pt to receive abx via a PICC line and say that they will bring her to Dr Potts's office or to [facility] where these interventions can be done.  Explained to daughters that based on subjective and objective data we have- including her vitals and labs, she has completed her course of abx and does not require any further abx and doing so would put her at risk for other adverse affects or poor outcomes.  Our ID team graciously saw pt as well and recommended that pt does not need IV abx or any further PO tx for UTI, however suggested that pt's ID dr may recommend an extended course of macrobid if she would like to.  Daughters went on to accuse [facility] physicians of unethical behavior yesterday because they gave pt a d/c notice when she was encephalopathic, even though the team evaluated the pt's capacity to make these decisions and felt she was competent.  Today I was unable to assess pt fully, however she was appropriate with me on questioning.  Pt closed her eyes for much of the time I was in the room, possibly due to overall hostile environment.  The outcome of today's events- the daughters will  the patient and would like to bring her to Dr Potts's office or [facility] for further evaluation.  She cursed at me, made multiple derogatory and defaming remarks to the HS, and was overall inappropriate in her behavior.  She says she will crow us and report us all to the Board.  I called pt's ID  to alert her on the above and spoke with office staff who will relay the message.  We will try to reach out the [facility] ED as well. Pt seen and examined, pt with no complaints.  Say she has good appetitive, no constipation, no pain, no dysuria, all other ROS negative.  VSS  Exam- NAD, pleasant female, RRR, CTAB, abd soft, no CVAT, no SPT, spastic RLE likely 2/2 known MS  Labs and cx data reviewed  A/P: 74 yo F with  1. Treated UTI  2. Encephalopathy, now resolved  3. CKD 3  4. Multiple sclerosis  5. HTN  6. Hyperlipidemia  7. Hypothyroidism  Had two conversations with pt's daughters- one at bedside in the AM and one over the phone at 3 pm. They are very upset, threatening to crow the hospital and all the providers, verbally abusive and threatening to housestaff and myself.  Pt pleasant and closed eyes for most of the encounters.  Pt's daughters say that pt has a hx of UTIs and requires further tx with IV abx via a PICC line x 10 days.  They say that their outpt ID Dr Potts is agreeable to this plan, however per HS who talked to Dr Potts last evening, she says that the family is forceful and makes her give abx because they attribute her neurological deterioration to encephalopathy 2/2 UTIs, and not to her multiple sclerosis itself.  Dr Potts told HS that if UA/Ucx not suggestive of UTI then she does not require further tx, however the daughters are screaming/demanding/hysterical/cursing that Dr Potts wants pt to receive abx via a PICC line and say that they will bring her to Dr Potts's office or to [facility] where these interventions can be done.  Explained to daughters that based on subjective and objective data we have- including her vitals and labs, she has completed her course of abx and does not require any further abx and doing so would put her at risk for other adverse affects or poor outcomes.  Our ID team graciously saw pt as well and recommended that pt does not need IV abx or any further PO tx for UTI, however suggested that pt's ID dr may recommend an extended course of macrobid if she would like to.  Daughters went on to accuse [facility] physicians of unethical behavior yesterday because they gave pt a d/c notice when she was encephalopathic, even though the team evaluated the pt's capacity to make these decisions and felt she was competent.  Today I was unable to assess pt fully, however she was appropriate with me on questioning.  Pt closed her eyes for much of the time I was in the room, possibly due to overall hostile environment.  The outcome of today's events- the daughters will  the patient and would like to bring her to Dr Potts's office or [facility] for further evaluation.  She cursed at me, made multiple derogatory and defaming remarks to the HS, and was overall inappropriate in her behavior.  She says she will crow us and report us all to the Board.  I called pt's ID  to alert her on the above and spoke with office staff who will relay the message.  We will try to reach out the [facility] ED as well.  Time spent: 60 min.

## 2018-03-08 NOTE — CHART NOTE - NSCHARTNOTEFT_GEN_A_CORE
Daughter refused to sign discharge note, agitated, and stated that moving forward, the  will contact the hospital.

## 2018-03-08 NOTE — CHART NOTE - NSCHARTNOTEFT_GEN_A_CORE
Was called to bedside for very upset daughter stating that she was promised her mother (patient) would receive 10 day course of IV abx. Was called to bedside for very upset daughter stating that she was promised her mother (patient) would receive 10 day course of IV abx in ED. Informed daughter that patient's clinical presentation, vital signs, CBC, and UA showed no signs for infection or UTI. Patient's daughter showed me an outpatient urine culture which showed that e. coli was sensitive to macrobid. After long discussion patient's daughter contacted her outpatient ID physician, Dr. Mckeon, who informed me that patient's daughters have been very concerned about patient's decline in mental status. Dr. Mckeon stated that she believed mental status decline was likely due to patient's MS, and not infection but daughters believe the mental status decline is due to an untreated, colonizing infection. Dr. Mckeon continued saying that patient has seen multiple ID physicians around the city to "treat this infection." I was informed that she was going to attempt a course of IV antibiotics, and had told the daughters about this. She had told me that no antibiotics needed to be started that night, and that she would have a discussion with the team's attending the next morning.

## 2018-03-08 NOTE — CONSULT NOTE ADULT - ASSESSMENT
73F pmhx MS, recurrent UTI HTN, HLD who presents after treatment for a UTI for 2 week with macrobid without improvement. Diagnosed with MS in 2001, initial symptom was weakness in the lower extremity, brought in for possible resistant UTI and weakness, now feeling better and suggested for KEITH however family thinks appropriate to continue IV antibiotic. Outpatient provider states to discontinue antibiotic given sterile urine and negative urinalysis    #?UTI  - Likely not to give antibiotics, however will see patient and decide this afternoon    Will continue to follow with you, final recommendations forthcoming this afternoon 73F pmhx MS, recurrent UTI HTN, HLD who presents after treatment for a UTI for 2 week with macrobid without improvement. Diagnosed with MS in 2001, initial symptom was weakness in the lower extremity, brought in for possible resistant UTI and weakness, now feeling better and suggested for KEITH however family thinks appropriate to continue IV antibiotic. Outpatient provider states to discontinue antibiotic given sterile urine and negative urinalysis    #?UTI  - Patient was on nitrofurantoin for some days prior to UA in ED, and so clean UA and urine culture encouraging that if patient is infected (although no signs of it on exam or lab work), it is likely susceptible to nitrofurantoin. Would continue on nitrofurantoin for discharge and have patient follow up with outpatient ID specialist Dr. Mckeon regarding duration of therapy or switching to another agent  - Spoke with patient's daughter's over phone as HHA at bedside told "not to answer or ask any questions". Daughters answered question posed then proceeded to continuously demand IV antibiotics, repeatedly demand answers that could not be provided and became very agitated over the phone, not accepting polite declinations to answer questions the writer did not have answers to such as when the patient was getting IV antibiotics and when an answer would be called. After multiple attempts to placate, the writer was forced to give the phone back to the HHA in order to continue the conversation with the patient herself. No further contact with daughters attempted by writer this evening but was discussed with primary team and attending. Patient is known to Dr. Arriaga, please see below for further input regarding this tenuous situation.     Will sign off, please re-consult with further questions or concerns  Patient seen and examined bedside with Dr. Arriaga, case discussed with primary team

## 2018-03-11 LAB
CULTURE RESULTS: SIGNIFICANT CHANGE UP
CULTURE RESULTS: SIGNIFICANT CHANGE UP
SPECIMEN SOURCE: SIGNIFICANT CHANGE UP
SPECIMEN SOURCE: SIGNIFICANT CHANGE UP

## 2018-03-13 DIAGNOSIS — E03.9 HYPOTHYROIDISM, UNSPECIFIED: ICD-10-CM

## 2018-03-13 DIAGNOSIS — G92 TOXIC ENCEPHALOPATHY: ICD-10-CM

## 2018-03-13 DIAGNOSIS — N39.0 URINARY TRACT INFECTION, SITE NOT SPECIFIED: ICD-10-CM

## 2018-03-13 DIAGNOSIS — I12.9 HYPERTENSIVE CHRONIC KIDNEY DISEASE WITH STAGE 1 THROUGH STAGE 4 CHRONIC KIDNEY DISEASE, OR UNSPECIFIED CHRONIC KIDNEY DISEASE: ICD-10-CM

## 2018-03-13 DIAGNOSIS — N18.3 CHRONIC KIDNEY DISEASE, STAGE 3 (MODERATE): ICD-10-CM

## 2018-03-13 DIAGNOSIS — T37.8X5A ADVERSE EFFECT OF OTHER SPECIFIED SYSTEMIC ANTI-INFECTIVES AND ANTIPARASITICS, INITIAL ENCOUNTER: ICD-10-CM

## 2018-03-13 DIAGNOSIS — M54.2 CERVICALGIA: ICD-10-CM

## 2018-03-13 DIAGNOSIS — E78.5 HYPERLIPIDEMIA, UNSPECIFIED: ICD-10-CM
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
## 2018-03-13 DIAGNOSIS — T36.95XA ADVERSE EFFECT OF UNSPECIFIED SYSTEMIC ANTIBIOTIC, INITIAL ENCOUNTER: ICD-10-CM

## 2018-03-13 DIAGNOSIS — G35 MULTIPLE SCLEROSIS: ICD-10-CM

## 2018-03-21 ENCOUNTER — EMERGENCY (EMERGENCY)
Facility: HOSPITAL | Age: 73
LOS: 1 days | Discharge: HOME | End: 2018-03-21
Attending: EMERGENCY MEDICINE | Admitting: EMERGENCY MEDICINE
Payer: MEDICARE

## 2018-03-21 VITALS
WEIGHT: 199.96 LBS | RESPIRATION RATE: 18 BRPM | TEMPERATURE: 98 F | HEIGHT: 64 IN | OXYGEN SATURATION: 97 % | SYSTOLIC BLOOD PRESSURE: 181 MMHG | DIASTOLIC BLOOD PRESSURE: 101 MMHG | HEART RATE: 71 BPM

## 2018-03-21 VITALS
DIASTOLIC BLOOD PRESSURE: 92 MMHG | HEART RATE: 70 BPM | RESPIRATION RATE: 16 BRPM | SYSTOLIC BLOOD PRESSURE: 178 MMHG | OXYGEN SATURATION: 97 %

## 2018-03-21 DIAGNOSIS — Z45.2 ENCOUNTER FOR ADJUSTMENT AND MANAGEMENT OF VASCULAR ACCESS DEVICE: ICD-10-CM

## 2018-03-21 DIAGNOSIS — Z79.82 LONG TERM (CURRENT) USE OF ASPIRIN: ICD-10-CM

## 2018-03-21 DIAGNOSIS — I10 ESSENTIAL (PRIMARY) HYPERTENSION: ICD-10-CM

## 2018-03-21 DIAGNOSIS — E78.5 HYPERLIPIDEMIA, UNSPECIFIED: ICD-10-CM

## 2018-03-21 DIAGNOSIS — Z79.899 OTHER LONG TERM (CURRENT) DRUG THERAPY: ICD-10-CM

## 2018-03-21 PROBLEM — N39.0 URINARY TRACT INFECTION, SITE NOT SPECIFIED: Chronic | Status: ACTIVE | Noted: 2018-03-06

## 2018-03-21 PROCEDURE — 99283 EMERGENCY DEPT VISIT LOW MDM: CPT | Mod: 25

## 2018-03-21 PROCEDURE — 96372 THER/PROPH/DIAG INJ SC/IM: CPT

## 2018-03-21 PROCEDURE — 99283 EMERGENCY DEPT VISIT LOW MDM: CPT

## 2018-03-21 RX ORDER — BISOPROLOL FUMARATE 10 MG/1
1 TABLET, FILM COATED ORAL
Qty: 0 | Refills: 0 | COMMUNITY

## 2018-03-21 RX ORDER — OLMESARTAN MEDOXOMIL 5 MG/1
1 TABLET, FILM COATED ORAL
Qty: 0 | Refills: 0 | COMMUNITY

## 2018-03-21 RX ORDER — ASPIRIN/CALCIUM CARB/MAGNESIUM 324 MG
1 TABLET ORAL
Qty: 0 | Refills: 0 | COMMUNITY

## 2018-03-21 RX ORDER — ERTAPENEM SODIUM 1 G/1
1000 INJECTION, POWDER, LYOPHILIZED, FOR SOLUTION INTRAMUSCULAR; INTRAVENOUS ONCE
Qty: 0 | Refills: 0 | Status: COMPLETED | OUTPATIENT
Start: 2018-03-21 | End: 2018-03-21

## 2018-03-21 RX ORDER — UBIDECARENONE 100 MG
1 CAPSULE ORAL
Qty: 0 | Refills: 0 | COMMUNITY

## 2018-03-21 RX ORDER — GABAPENTIN 400 MG/1
600 CAPSULE ORAL
Qty: 0 | Refills: 0 | COMMUNITY

## 2018-03-21 RX ORDER — TAMSULOSIN HYDROCHLORIDE 0.4 MG/1
0.4 CAPSULE ORAL
Qty: 0 | Refills: 0 | COMMUNITY

## 2018-03-21 RX ORDER — ROPINIROLE 8 MG/1
1 TABLET, FILM COATED, EXTENDED RELEASE ORAL
Qty: 0 | Refills: 0 | COMMUNITY

## 2018-03-21 RX ORDER — ATORVASTATIN CALCIUM 80 MG/1
1 TABLET, FILM COATED ORAL
Qty: 0 | Refills: 0 | COMMUNITY

## 2018-03-21 RX ORDER — LEVOTHYROXINE SODIUM 125 MCG
1 TABLET ORAL
Qty: 0 | Refills: 0 | COMMUNITY

## 2018-03-21 RX ORDER — MEMANTINE HYDROCHLORIDE 10 MG/1
10 TABLET ORAL
Qty: 0 | Refills: 0 | COMMUNITY

## 2018-03-21 RX ADMIN — ERTAPENEM SODIUM 1000 MILLIGRAM(S): 1 INJECTION, POWDER, LYOPHILIZED, FOR SOLUTION INTRAMUSCULAR; INTRAVENOUS at 13:45

## 2018-03-21 NOTE — ED ADULT TRIAGE NOTE - CHIEF COMPLAINT QUOTE
Pt BIBA from home for PICC placement.  Per EMS, PICC line was attempted at home yesterday but unsuccessful and patient was instructed to come to ED today.  PT denies N/V/D, SOB, Fevers, CP and Dizziness

## 2018-03-21 NOTE — ED PROVIDER NOTE - OBJECTIVE STATEMENT
73F pmhx MS, recurrent UTI w/ MDR bugs, HTN, HLD, MS, recent admission for concern for UTI thought to actually be colonization, dc without abx, seen as outpatient by other doctors and decided to start PICC and do IV abx. Got two doses already but nurse came today to place PICC and was unable to, so called EMS for pt and sent to the ED. Pt has no acute complaints. Accompanied by her HHA. Spoke w/ daughter Jessica on the phone who gave hx. Requesting to please help them and place PICC from ED. 73F pmhx MS, recurrent UTI w/ MDR bugs, HTN, HLD, MS, recent admission for concern for UTI thought to actually be colonization, dc without abx, seen as outpatient by other doctors and decided to start PICC and do IV abx. Got two doses already but nurse from infusion company came today to place PICC and was unable to, so called EMS for pt and sent to the ED. Pt has no acute complaints. Accompanied by her HHA. Spoke w/ daughter Jessica on the phone who gave hx. Requesting to please help them and place PICC from ED.

## 2018-03-21 NOTE — ED PROVIDER NOTE - PROGRESS NOTE DETAILS
pt has no pcp - pts daughter states she only sees physicians to "treat and release"  attemping to reach her ID doctor, Ana Maria Mckeon.   ID here did not feel pt needed abx or PICC line.  Pts daughter threatening litigation at this time to .

## 2018-03-21 NOTE — ED PROVIDER NOTE - MEDICAL DECISION MAKING DETAILS
here requesting PICC line placement. will contact IR and hospitalist to try to arrange. no acute complaints otherwise so no further workup done. here requesting PICC line placement. will contact case management, IR and hospitalist to try to arrange. no acute complaints otherwise so no further workup done.

## 2018-03-21 NOTE — ED PROVIDER NOTE - CARE PLAN
Principal Discharge DX:	S/P PICC central line placement Principal Discharge DX:	Examination and observation

## 2021-09-23 NOTE — PHYSICAL THERAPY INITIAL EVALUATION ADULT - TRANSFER SKILLS, REHAB EVAL
Quality 130: Documentation Of Current Medications In The Medical Record: Current Medications Documented
Quality 110: Preventive Care And Screening: Influenza Immunization: Influenza Immunization Ordered or Recommended, but not Administered due to system reason
Detail Level: Detailed
independent
Quality 226: Preventive Care And Screening: Tobacco Use: Screening And Cessation Intervention: Patient screened for tobacco use and is an ex/non-smoker
Quality 431: Preventive Care And Screening: Unhealthy Alcohol Use - Screening: Patient screened for unhealthy alcohol use using a single question and scores less than 2 times per year
Quality 111:Pneumonia Vaccination Status For Older Adults: Pneumococcal Vaccination not Administered or Previously Received, Reason not Otherwise Specified